# Patient Record
Sex: FEMALE | Race: BLACK OR AFRICAN AMERICAN | NOT HISPANIC OR LATINO
[De-identification: names, ages, dates, MRNs, and addresses within clinical notes are randomized per-mention and may not be internally consistent; named-entity substitution may affect disease eponyms.]

---

## 2017-02-24 ENCOUNTER — APPOINTMENT (OUTPATIENT)
Dept: INTERNAL MEDICINE | Facility: CLINIC | Age: 77
End: 2017-02-24

## 2017-02-24 VITALS — DIASTOLIC BLOOD PRESSURE: 76 MMHG | HEART RATE: 70 BPM | SYSTOLIC BLOOD PRESSURE: 142 MMHG | RESPIRATION RATE: 12 BRPM

## 2017-02-24 VITALS — HEIGHT: 67 IN | BODY MASS INDEX: 24.17 KG/M2 | WEIGHT: 154 LBS

## 2017-02-28 LAB
APPEARANCE: CLEAR
BILIRUBIN URINE: NEGATIVE
BLOOD URINE: NEGATIVE
COLOR: YELLOW
GLUCOSE QUALITATIVE U: NORMAL
KETONES URINE: NEGATIVE
LEUKOCYTE ESTERASE URINE: NORMAL
NITRITE URINE: NEGATIVE
PH URINE: 5
PROTEIN URINE: NEGATIVE
SPECIFIC GRAVITY URINE: 1.01
UROBILINOGEN URINE: NORMAL

## 2017-04-21 ENCOUNTER — FORM ENCOUNTER (OUTPATIENT)
Age: 77
End: 2017-04-21

## 2017-04-22 ENCOUNTER — OUTPATIENT (OUTPATIENT)
Dept: OUTPATIENT SERVICES | Facility: HOSPITAL | Age: 77
LOS: 1 days | End: 2017-04-22
Payer: MEDICARE

## 2017-04-22 ENCOUNTER — APPOINTMENT (OUTPATIENT)
Dept: MAMMOGRAPHY | Facility: IMAGING CENTER | Age: 77
End: 2017-04-22

## 2017-04-22 DIAGNOSIS — Z00.8 ENCOUNTER FOR OTHER GENERAL EXAMINATION: ICD-10-CM

## 2017-04-22 PROCEDURE — 77067 SCR MAMMO BI INCL CAD: CPT

## 2017-04-22 PROCEDURE — 77063 BREAST TOMOSYNTHESIS BI: CPT

## 2017-05-02 ENCOUNTER — FORM ENCOUNTER (OUTPATIENT)
Age: 77
End: 2017-05-02

## 2017-05-03 ENCOUNTER — OUTPATIENT (OUTPATIENT)
Dept: OUTPATIENT SERVICES | Facility: HOSPITAL | Age: 77
LOS: 1 days | End: 2017-05-03
Payer: MEDICARE

## 2017-05-03 ENCOUNTER — APPOINTMENT (OUTPATIENT)
Dept: ULTRASOUND IMAGING | Facility: IMAGING CENTER | Age: 77
End: 2017-05-03

## 2017-05-03 ENCOUNTER — APPOINTMENT (OUTPATIENT)
Dept: MAMMOGRAPHY | Facility: IMAGING CENTER | Age: 77
End: 2017-05-03

## 2017-05-03 DIAGNOSIS — Z00.8 ENCOUNTER FOR OTHER GENERAL EXAMINATION: ICD-10-CM

## 2017-05-03 PROCEDURE — G0279: CPT

## 2017-05-03 PROCEDURE — 76642 ULTRASOUND BREAST LIMITED: CPT

## 2017-05-03 PROCEDURE — 77065 DX MAMMO INCL CAD UNI: CPT

## 2017-05-08 ENCOUNTER — APPOINTMENT (OUTPATIENT)
Dept: INTERNAL MEDICINE | Facility: CLINIC | Age: 77
End: 2017-05-08

## 2017-05-08 VITALS — HEART RATE: 75 BPM | DIASTOLIC BLOOD PRESSURE: 58 MMHG | SYSTOLIC BLOOD PRESSURE: 100 MMHG | RESPIRATION RATE: 12 BRPM

## 2017-05-08 VITALS — BODY MASS INDEX: 24.33 KG/M2 | HEIGHT: 67 IN | WEIGHT: 155 LBS

## 2017-05-08 VITALS — DIASTOLIC BLOOD PRESSURE: 68 MMHG | SYSTOLIC BLOOD PRESSURE: 104 MMHG

## 2017-05-16 ENCOUNTER — APPOINTMENT (OUTPATIENT)
Dept: ULTRASOUND IMAGING | Facility: IMAGING CENTER | Age: 77
End: 2017-05-16

## 2017-05-16 ENCOUNTER — APPOINTMENT (OUTPATIENT)
Dept: MAMMOGRAPHY | Facility: IMAGING CENTER | Age: 77
End: 2017-05-16

## 2017-08-18 ENCOUNTER — APPOINTMENT (OUTPATIENT)
Dept: INTERNAL MEDICINE | Facility: CLINIC | Age: 77
End: 2017-08-18
Payer: MEDICARE

## 2017-08-18 ENCOUNTER — NON-APPOINTMENT (OUTPATIENT)
Age: 77
End: 2017-08-18

## 2017-08-18 VITALS — DIASTOLIC BLOOD PRESSURE: 60 MMHG | SYSTOLIC BLOOD PRESSURE: 108 MMHG

## 2017-08-18 PROCEDURE — 99215 OFFICE O/P EST HI 40 MIN: CPT | Mod: 25

## 2017-08-18 PROCEDURE — 36415 COLL VENOUS BLD VENIPUNCTURE: CPT

## 2017-08-18 PROCEDURE — 93000 ELECTROCARDIOGRAM COMPLETE: CPT

## 2017-08-23 LAB
25(OH)D3 SERPL-MCNC: 44.9 NG/ML
ALBUMIN SERPL ELPH-MCNC: 4.3 G/DL
ALP BLD-CCNC: 86 U/L
ALT SERPL-CCNC: 15 U/L
ANION GAP SERPL CALC-SCNC: 12 MMOL/L
AST SERPL-CCNC: 13 U/L
BASOPHILS # BLD AUTO: 0.03 K/UL
BASOPHILS NFR BLD AUTO: 0.3 %
BILIRUB DIRECT SERPL-MCNC: 0.1 MG/DL
BILIRUB INDIRECT SERPL-MCNC: 0.2 MG/DL
BILIRUB SERPL-MCNC: 0.3 MG/DL
BUN SERPL-MCNC: 18 MG/DL
CALCIUM SERPL-MCNC: 10.4 MG/DL
CHLORIDE SERPL-SCNC: 103 MMOL/L
CHOLEST SERPL-MCNC: 209 MG/DL
CHOLEST/HDLC SERPL: 3.1 RATIO
CO2 SERPL-SCNC: 27 MMOL/L
CREAT SERPL-MCNC: 1.11 MG/DL
EOSINOPHIL # BLD AUTO: 0.08 K/UL
EOSINOPHIL NFR BLD AUTO: 0.9 %
GLUCOSE SERPL-MCNC: 71 MG/DL
HBA1C MFR BLD HPLC: 6 %
HCT VFR BLD CALC: 42.7 %
HDLC SERPL-MCNC: 68 MG/DL
HGB BLD-MCNC: 12.8 G/DL
IMM GRANULOCYTES NFR BLD AUTO: 0.2 %
LDLC SERPL CALC-MCNC: 123 MG/DL
LYMPHOCYTES # BLD AUTO: 2.64 K/UL
LYMPHOCYTES NFR BLD AUTO: 30.4 %
MAN DIFF?: NORMAL
MCHC RBC-ENTMCNC: 30 GM/DL
MCHC RBC-ENTMCNC: 30.3 PG
MCV RBC AUTO: 100.9 FL
MONOCYTES # BLD AUTO: 0.68 K/UL
MONOCYTES NFR BLD AUTO: 7.8 %
NEUTROPHILS # BLD AUTO: 5.22 K/UL
NEUTROPHILS NFR BLD AUTO: 60.4 %
PLATELET # BLD AUTO: 287 K/UL
POTASSIUM SERPL-SCNC: 4.9 MMOL/L
PROT SERPL-MCNC: 7.5 G/DL
RBC # BLD: 4.23 M/UL
RBC # FLD: 13.8 %
SODIUM SERPL-SCNC: 142 MMOL/L
TRIGL SERPL-MCNC: 92 MG/DL
WBC # FLD AUTO: 8.67 K/UL

## 2017-11-16 ENCOUNTER — APPOINTMENT (OUTPATIENT)
Dept: INTERNAL MEDICINE | Facility: CLINIC | Age: 77
End: 2017-11-16
Payer: MEDICARE

## 2017-11-16 VITALS — RESPIRATION RATE: 12 BRPM | SYSTOLIC BLOOD PRESSURE: 116 MMHG | HEART RATE: 71 BPM | DIASTOLIC BLOOD PRESSURE: 66 MMHG

## 2017-11-16 VITALS — HEIGHT: 67 IN | BODY MASS INDEX: 25.11 KG/M2 | WEIGHT: 160 LBS

## 2017-11-16 PROCEDURE — 99214 OFFICE O/P EST MOD 30 MIN: CPT | Mod: 25

## 2017-11-16 PROCEDURE — G0008: CPT

## 2017-11-16 PROCEDURE — 90662 IIV NO PRSV INCREASED AG IM: CPT

## 2017-11-25 ENCOUNTER — RX RENEWAL (OUTPATIENT)
Age: 77
End: 2017-11-25

## 2017-12-29 ENCOUNTER — RX RENEWAL (OUTPATIENT)
Age: 77
End: 2017-12-29

## 2018-01-16 ENCOUNTER — FORM ENCOUNTER (OUTPATIENT)
Age: 78
End: 2018-01-16

## 2018-01-17 ENCOUNTER — OUTPATIENT (OUTPATIENT)
Dept: OUTPATIENT SERVICES | Facility: HOSPITAL | Age: 78
LOS: 1 days | End: 2018-01-17
Payer: MEDICARE

## 2018-01-17 ENCOUNTER — APPOINTMENT (OUTPATIENT)
Dept: MAMMOGRAPHY | Facility: IMAGING CENTER | Age: 78
End: 2018-01-17
Payer: MEDICARE

## 2018-01-17 ENCOUNTER — APPOINTMENT (OUTPATIENT)
Dept: ULTRASOUND IMAGING | Facility: IMAGING CENTER | Age: 78
End: 2018-01-17
Payer: MEDICARE

## 2018-01-17 DIAGNOSIS — Z00.00 ENCOUNTER FOR GENERAL ADULT MEDICAL EXAMINATION WITHOUT ABNORMAL FINDINGS: ICD-10-CM

## 2018-01-17 PROCEDURE — 77065 DX MAMMO INCL CAD UNI: CPT | Mod: 26,LT

## 2018-01-17 PROCEDURE — 77065 DX MAMMO INCL CAD UNI: CPT

## 2018-01-17 PROCEDURE — G0279: CPT | Mod: 26

## 2018-01-17 PROCEDURE — G0279: CPT

## 2018-02-20 ENCOUNTER — RX RENEWAL (OUTPATIENT)
Age: 78
End: 2018-02-20

## 2018-03-08 ENCOUNTER — APPOINTMENT (OUTPATIENT)
Dept: INTERNAL MEDICINE | Facility: CLINIC | Age: 78
End: 2018-03-08
Payer: MEDICARE

## 2018-03-08 VITALS — HEIGHT: 67 IN | BODY MASS INDEX: 24.8 KG/M2 | WEIGHT: 158 LBS

## 2018-03-08 VITALS — SYSTOLIC BLOOD PRESSURE: 108 MMHG | DIASTOLIC BLOOD PRESSURE: 74 MMHG

## 2018-03-08 VITALS — DIASTOLIC BLOOD PRESSURE: 80 MMHG | HEART RATE: 70 BPM | RESPIRATION RATE: 12 BRPM | SYSTOLIC BLOOD PRESSURE: 104 MMHG

## 2018-03-08 PROCEDURE — 99214 OFFICE O/P EST MOD 30 MIN: CPT

## 2018-04-23 ENCOUNTER — RX RENEWAL (OUTPATIENT)
Age: 78
End: 2018-04-23

## 2018-05-30 ENCOUNTER — FORM ENCOUNTER (OUTPATIENT)
Age: 78
End: 2018-05-30

## 2018-05-31 ENCOUNTER — APPOINTMENT (OUTPATIENT)
Dept: ULTRASOUND IMAGING | Facility: IMAGING CENTER | Age: 78
End: 2018-05-31
Payer: MEDICARE

## 2018-05-31 ENCOUNTER — RX RENEWAL (OUTPATIENT)
Age: 78
End: 2018-05-31

## 2018-05-31 ENCOUNTER — APPOINTMENT (OUTPATIENT)
Dept: MAMMOGRAPHY | Facility: IMAGING CENTER | Age: 78
End: 2018-05-31
Payer: MEDICARE

## 2018-05-31 ENCOUNTER — OUTPATIENT (OUTPATIENT)
Dept: OUTPATIENT SERVICES | Facility: HOSPITAL | Age: 78
LOS: 1 days | End: 2018-05-31
Payer: MEDICARE

## 2018-05-31 DIAGNOSIS — Z00.00 ENCOUNTER FOR GENERAL ADULT MEDICAL EXAMINATION WITHOUT ABNORMAL FINDINGS: ICD-10-CM

## 2018-05-31 PROCEDURE — 77067 SCR MAMMO BI INCL CAD: CPT | Mod: 26

## 2018-05-31 PROCEDURE — 77063 BREAST TOMOSYNTHESIS BI: CPT | Mod: 26

## 2018-05-31 PROCEDURE — 77063 BREAST TOMOSYNTHESIS BI: CPT

## 2018-05-31 PROCEDURE — 77067 SCR MAMMO BI INCL CAD: CPT

## 2018-05-31 PROCEDURE — 76641 ULTRASOUND BREAST COMPLETE: CPT

## 2018-05-31 PROCEDURE — 76641 ULTRASOUND BREAST COMPLETE: CPT | Mod: 26,50

## 2018-06-27 ENCOUNTER — APPOINTMENT (OUTPATIENT)
Dept: INTERNAL MEDICINE | Facility: CLINIC | Age: 78
End: 2018-06-27
Payer: MEDICARE

## 2018-06-27 VITALS — RESPIRATION RATE: 12 BRPM | SYSTOLIC BLOOD PRESSURE: 120 MMHG | HEART RATE: 72 BPM | DIASTOLIC BLOOD PRESSURE: 70 MMHG

## 2018-06-27 VITALS — HEIGHT: 67 IN | WEIGHT: 161 LBS | BODY MASS INDEX: 25.27 KG/M2

## 2018-06-27 PROCEDURE — 36415 COLL VENOUS BLD VENIPUNCTURE: CPT

## 2018-06-27 PROCEDURE — 99214 OFFICE O/P EST MOD 30 MIN: CPT | Mod: 25

## 2018-06-27 NOTE — REVIEW OF SYSTEMS
[Headache] : headache [Anxiety] : anxiety [Depression] : depression [Negative] : Heme/Lymph [Suicidal] : not suicidal [FreeTextEntry2] : see HPI [de-identified] : See HPI

## 2018-06-27 NOTE — HISTORY OF PRESENT ILLNESS
[FreeTextEntry1] : F/up for DM HCVD chol low vit D  \par c/o headache.  Few details offered.  . \par

## 2018-06-27 NOTE — ASSESSMENT
[FreeTextEntry1] : Denies hallucinations that she has about her home. \par Moist cerumen bilat decline ENT\par Denies facial numbness in bed  with change in speech, arm or leg weakness.  Cont ASA\par Falling episodes in past. Resolved  Sees Neuro Dr. Morgan [facility]  EEG neg.  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 Cont  ASA.\par HCVD\par  Anemia on Fe.  had transfusion 2013 with neg EGD and colonoscopy  Hb normal.  Rec DC\par HCVD. OK\par Hyperchol.  Improved  taking Zocor most days.   \par DM HBA1C    \par Urinary burning resolved.  Admits her freq is large volume and she drinks well. \par Cataracts.  Told 1 yr f/up\par Constip.  Ok to use MiraLAX qd.\par

## 2018-06-27 NOTE — HEALTH RISK ASSESSMENT
[No falls in past year] : Patient reported no falls in the past year [0] : 2) Feeling down, depressed, or hopeless: Not at all (0) [] : No [MPG1Osusp] : 0

## 2018-06-27 NOTE — PHYSICAL EXAM
[No Acute Distress] : no acute distress [Well Nourished] : well nourished [Well Developed] : well developed [Well-Appearing] : well-appearing [Normal Sclera/Conjunctiva] : normal sclera/conjunctiva [PERRL] : pupils equal round and reactive to light [EOMI] : extraocular movements intact [Normal Outer Ear/Nose] : the outer ears and nose were normal in appearance [Normal Oropharynx] : the oropharynx was normal [Normal TMs] : both tympanic membranes were normal [No JVD] : no jugular venous distention [No Lymphadenopathy] : no lymphadenopathy [Thyroid Normal, No Nodules] : the thyroid was normal and there were no nodules present [No Respiratory Distress] : no respiratory distress  [Clear to Auscultation] : lungs were clear to auscultation bilaterally [No Accessory Muscle Use] : no accessory muscle use [Normal Rate] : normal rate  [Regular Rhythm] : with a regular rhythm [Normal S1, S2] : normal S1 and S2 [No Murmur] : no murmur heard [No Carotid Bruits] : no carotid bruits [No Abdominal Bruit] : a ~M bruit was not heard ~T in the abdomen [No Varicosities] : no varicosities [Pedal Pulses Present] : the pedal pulses are present [No Edema] : there was no peripheral edema [No Extremity Clubbing/Cyanosis] : no extremity clubbing/cyanosis [No Palpable Aorta] : no palpable aorta [Soft] : abdomen soft [Non Tender] : non-tender [Non-distended] : non-distended [No Masses] : no abdominal mass palpated [No HSM] : no HSM [Normal Bowel Sounds] : normal bowel sounds [Normal Supraclavicular Nodes] : no supraclavicular lymphadenopathy [Normal Posterior Cervical Nodes] : no posterior cervical lymphadenopathy [Normal Anterior Cervical Nodes] : no anterior cervical lymphadenopathy [No CVA Tenderness] : no CVA  tenderness [No Spinal Tenderness] : no spinal tenderness [No Joint Swelling] : no joint swelling [Grossly Normal Strength/Tone] : grossly normal strength/tone [No Skin Lesions] : no skin lesions [Normal Gait] : normal gait [Coordination Grossly Intact] : coordination grossly intact [No Focal Deficits] : no focal deficits [Speech Grossly Normal] : speech grossly normal [Memory Grossly Normal] : memory grossly normal [Normal Affect] : the affect was normal [Alert and Oriented x3] : oriented to person, place, and time [Normal Mood] : the mood was normal [Normal Insight/Judgement] : insight and judgment were intact [Comprehensive Foot Exam Normal] : Right and left foot were examined and both feet are normal. No ulcers in either foot. Toes are normal and with full ROM.  Normal tactile sensation with monofilament testing throughout both feet [de-identified] : cerumen impac bilat moist

## 2018-06-28 LAB
25(OH)D3 SERPL-MCNC: 39.7 NG/ML
ALBUMIN SERPL ELPH-MCNC: 4.1 G/DL
ALP BLD-CCNC: 72 U/L
ALT SERPL-CCNC: 16 U/L
ANION GAP SERPL CALC-SCNC: 12 MMOL/L
AST SERPL-CCNC: 22 U/L
BASOPHILS # BLD AUTO: 0.02 K/UL
BASOPHILS NFR BLD AUTO: 0.2 %
BILIRUB SERPL-MCNC: 0.3 MG/DL
BUN SERPL-MCNC: 23 MG/DL
CALCIUM SERPL-MCNC: 9.6 MG/DL
CHLORIDE SERPL-SCNC: 102 MMOL/L
CHOLEST SERPL-MCNC: 195 MG/DL
CHOLEST/HDLC SERPL: 2.8 RATIO
CO2 SERPL-SCNC: 27 MMOL/L
CREAT SERPL-MCNC: 0.99 MG/DL
EOSINOPHIL # BLD AUTO: 0.03 K/UL
EOSINOPHIL NFR BLD AUTO: 0.3 %
GLUCOSE SERPL-MCNC: 72 MG/DL
HBA1C MFR BLD HPLC: 5.7 %
HCT VFR BLD CALC: 40.7 %
HCV AB SER QL: NONREACTIVE
HCV S/CO RATIO: 0.19 S/CO
HDLC SERPL-MCNC: 70 MG/DL
HGB BLD-MCNC: 12.7 G/DL
IMM GRANULOCYTES NFR BLD AUTO: 0.2 %
LDLC SERPL CALC-MCNC: 104 MG/DL
LYMPHOCYTES # BLD AUTO: 2.25 K/UL
LYMPHOCYTES NFR BLD AUTO: 25 %
MAN DIFF?: NORMAL
MCHC RBC-ENTMCNC: 31.1 PG
MCHC RBC-ENTMCNC: 31.2 GM/DL
MCV RBC AUTO: 99.8 FL
MONOCYTES # BLD AUTO: 0.64 K/UL
MONOCYTES NFR BLD AUTO: 7.1 %
NEUTROPHILS # BLD AUTO: 6.05 K/UL
NEUTROPHILS NFR BLD AUTO: 67.2 %
PLATELET # BLD AUTO: 251 K/UL
POTASSIUM SERPL-SCNC: 4.6 MMOL/L
PROT SERPL-MCNC: 7 G/DL
RBC # BLD: 4.08 M/UL
RBC # FLD: 13.6 %
SODIUM SERPL-SCNC: 141 MMOL/L
TRIGL SERPL-MCNC: 106 MG/DL
TSH SERPL-ACNC: 0.93 UIU/ML
WBC # FLD AUTO: 9.01 K/UL

## 2018-08-01 ENCOUNTER — APPOINTMENT (OUTPATIENT)
Dept: INTERNAL MEDICINE | Facility: CLINIC | Age: 78
End: 2018-08-01
Payer: MEDICARE

## 2018-08-01 VITALS — RESPIRATION RATE: 12 BRPM | HEART RATE: 70 BPM | DIASTOLIC BLOOD PRESSURE: 68 MMHG | SYSTOLIC BLOOD PRESSURE: 102 MMHG

## 2018-08-01 PROCEDURE — 99214 OFFICE O/P EST MOD 30 MIN: CPT

## 2018-08-01 NOTE — HISTORY OF PRESENT ILLNESS
[FreeTextEntry1] : F/up for DM HCVD chol low vit D  \par c/o "I don’t know"  Says she walks off balance.  "hand tremble"  Says "the remote for the TV was in my hands and I didn’t know what to do with it"  \par Admits "maybe" when asked about depression.\par Admits she forgets her meds "sometimes" \par

## 2018-08-01 NOTE — ASSESSMENT
[FreeTextEntry1] : Dementia, depression, anxiety. Hallucination in the past.  Should see neuro MD given her concerns about it with unsteady gait and tremors. \par Falling episodes in past. Resolved  [facility]  [facility]  EEG neg.  Carotid sono.  \par WIll refer to neuro MD since pt cant recall who she saw in the past. 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 Cont  ASA.\par Moist cerumen bilat decline ENT\par Anemia on Fe resolved.  Had transfusion 2013 with neg EGD and colonoscopy  Hb normal.  Rec DC\par HCVD. OK\par Hyperchol.  Improved  taking Zocor most days.   \par DM HBA1C    5.7\par Cataracts.  Told 1 yr f/up\par Constip.  Long hx.  Ok to use MiraLAX qd.\par

## 2018-08-01 NOTE — PHYSICAL EXAM
[No Acute Distress] : no acute distress [Well Nourished] : well nourished [Well Developed] : well developed [Well-Appearing] : well-appearing [Normal Sclera/Conjunctiva] : normal sclera/conjunctiva [PERRL] : pupils equal round and reactive to light [EOMI] : extraocular movements intact [Normal Outer Ear/Nose] : the outer ears and nose were normal in appearance [Normal Oropharynx] : the oropharynx was normal [Normal TMs] : both tympanic membranes were normal [No JVD] : no jugular venous distention [No Lymphadenopathy] : no lymphadenopathy [Thyroid Normal, No Nodules] : the thyroid was normal and there were no nodules present [No Respiratory Distress] : no respiratory distress  [Clear to Auscultation] : lungs were clear to auscultation bilaterally [No Accessory Muscle Use] : no accessory muscle use [Normal Rate] : normal rate  [Regular Rhythm] : with a regular rhythm [Normal S1, S2] : normal S1 and S2 [No Murmur] : no murmur heard [No Carotid Bruits] : no carotid bruits [No Abdominal Bruit] : a ~M bruit was not heard ~T in the abdomen [No Varicosities] : no varicosities [Pedal Pulses Present] : the pedal pulses are present [No Edema] : there was no peripheral edema [No Extremity Clubbing/Cyanosis] : no extremity clubbing/cyanosis [No Palpable Aorta] : no palpable aorta [Soft] : abdomen soft [Non Tender] : non-tender [Non-distended] : non-distended [No Masses] : no abdominal mass palpated [No HSM] : no HSM [Normal Bowel Sounds] : normal bowel sounds [Normal Supraclavicular Nodes] : no supraclavicular lymphadenopathy [Normal Posterior Cervical Nodes] : no posterior cervical lymphadenopathy [Normal Anterior Cervical Nodes] : no anterior cervical lymphadenopathy [No CVA Tenderness] : no CVA  tenderness [No Spinal Tenderness] : no spinal tenderness [No Joint Swelling] : no joint swelling [Grossly Normal Strength/Tone] : grossly normal strength/tone [No Skin Lesions] : no skin lesions [Normal Gait] : normal gait [Coordination Grossly Intact] : coordination grossly intact [No Focal Deficits] : no focal deficits [Speech Grossly Normal] : speech grossly normal [Memory Grossly Normal] : memory grossly normal [Normal Affect] : the affect was normal [Alert and Oriented x3] : oriented to person, place, and time [Normal Mood] : the mood was normal [Normal Insight/Judgement] : insight and judgment were intact [Comprehensive Foot Exam Normal] : Right and left foot were examined and both feet are normal. No ulcers in either foot. Toes are normal and with full ROM.  Normal tactile sensation with monofilament testing throughout both feet [No Rash] : no rash [de-identified] : cerumen mod bilat moist

## 2018-08-01 NOTE — REVIEW OF SYSTEMS
[Constipation] : constipation [Confusion] : confusion [Memory Loss] : memory loss [Unsteady Walking] : ataxia [Anxiety] : anxiety [Depression] : depression [Negative] : Heme/Lymph [Chest Pain] : no chest pain [Palpitations] : no palpitations [Lower Ext Edema] : no lower extremity edema [Melena] : no melena [Joint Pain] : no joint pain [Muscle Pain] : no muscle pain [Back Pain] : no back pain [Headache] : no headache [Dizziness] : no dizziness [Fainting] : no fainting [Suicidal] : not suicidal [FreeTextEntry2] : see HPI [FreeTextEntry7] : "suffers" from constip but suspect no change in pattern [de-identified] : See HPI

## 2018-08-01 NOTE — HEALTH RISK ASSESSMENT
[No falls in past year] : Patient reported no falls in the past year [1] : 2) Feeling down, depressed, or hopeless for several days (1) [] : No [[id number]] : 2

## 2018-08-30 ENCOUNTER — RX RENEWAL (OUTPATIENT)
Age: 78
End: 2018-08-30

## 2018-09-25 ENCOUNTER — LABORATORY RESULT (OUTPATIENT)
Age: 78
End: 2018-09-25

## 2018-09-25 ENCOUNTER — APPOINTMENT (OUTPATIENT)
Dept: NEUROLOGY | Facility: CLINIC | Age: 78
End: 2018-09-25
Payer: MEDICARE

## 2018-09-25 VITALS
OXYGEN SATURATION: 98 % | WEIGHT: 157 LBS | DIASTOLIC BLOOD PRESSURE: 80 MMHG | SYSTOLIC BLOOD PRESSURE: 118 MMHG | BODY MASS INDEX: 24.64 KG/M2 | HEIGHT: 67 IN | HEART RATE: 72 BPM

## 2018-09-25 PROCEDURE — 99205 OFFICE O/P NEW HI 60 MIN: CPT

## 2018-10-08 ENCOUNTER — FORM ENCOUNTER (OUTPATIENT)
Age: 78
End: 2018-10-08

## 2018-10-09 ENCOUNTER — APPOINTMENT (OUTPATIENT)
Dept: MRI IMAGING | Facility: IMAGING CENTER | Age: 78
End: 2018-10-09
Payer: MEDICARE

## 2018-10-09 ENCOUNTER — OUTPATIENT (OUTPATIENT)
Dept: OUTPATIENT SERVICES | Facility: HOSPITAL | Age: 78
LOS: 1 days | End: 2018-10-09
Payer: MEDICARE

## 2018-10-09 DIAGNOSIS — Z00.8 ENCOUNTER FOR OTHER GENERAL EXAMINATION: ICD-10-CM

## 2018-10-09 PROCEDURE — 70551 MRI BRAIN STEM W/O DYE: CPT | Mod: 26

## 2018-10-09 PROCEDURE — 70551 MRI BRAIN STEM W/O DYE: CPT

## 2018-11-30 ENCOUNTER — APPOINTMENT (OUTPATIENT)
Dept: NEUROLOGY | Facility: CLINIC | Age: 78
End: 2018-11-30

## 2019-01-08 ENCOUNTER — RX RENEWAL (OUTPATIENT)
Age: 79
End: 2019-01-08

## 2019-01-31 ENCOUNTER — APPOINTMENT (OUTPATIENT)
Dept: INTERNAL MEDICINE | Facility: CLINIC | Age: 79
End: 2019-01-31
Payer: MEDICARE

## 2019-01-31 ENCOUNTER — NON-APPOINTMENT (OUTPATIENT)
Age: 79
End: 2019-01-31

## 2019-01-31 VITALS — BODY MASS INDEX: 25.27 KG/M2 | HEIGHT: 67 IN | WEIGHT: 161 LBS

## 2019-01-31 VITALS — HEART RATE: 74 BPM | RESPIRATION RATE: 12 BRPM | SYSTOLIC BLOOD PRESSURE: 132 MMHG | DIASTOLIC BLOOD PRESSURE: 80 MMHG

## 2019-01-31 DIAGNOSIS — Z97.4 PRESENCE OF EXTERNAL HEARING-AID: ICD-10-CM

## 2019-01-31 PROCEDURE — 90662 IIV NO PRSV INCREASED AG IM: CPT

## 2019-01-31 PROCEDURE — 90670 PCV13 VACCINE IM: CPT

## 2019-01-31 PROCEDURE — G0008: CPT

## 2019-01-31 PROCEDURE — G0009: CPT

## 2019-01-31 PROCEDURE — 99214 OFFICE O/P EST MOD 30 MIN: CPT | Mod: 25

## 2019-01-31 NOTE — REVIEW OF SYSTEMS
[Constipation] : constipation [Confusion] : confusion [Memory Loss] : memory loss [Unsteady Walking] : ataxia [Anxiety] : anxiety [Depression] : depression [Negative] : Heme/Lymph [Chest Pain] : no chest pain [Palpitations] : no palpitations [Lower Ext Edema] : no lower extremity edema [Melena] : no melena [Joint Pain] : no joint pain [Muscle Pain] : no muscle pain [Back Pain] : no back pain [Headache] : no headache [Dizziness] : no dizziness [Fainting] : no fainting [Suicidal] : not suicidal [FreeTextEntry2] : see HPI [FreeTextEntry5] : Says she wants a heart test and says her heart was "fast" last week. [de-identified] : See HPI

## 2019-01-31 NOTE — HISTORY OF PRESENT ILLNESS
[FreeTextEntry1] : F/up for DM HCVD chol low vit D  Not taking Vit D\par Eval of dementia with vasc changes on MRI.  \par Asking about nightmares and hallucinations but no detail.\par Worried about "appetite" but no wt loss noted.  \par  \par  \par Admits she forgets her meds "sometimes" \par

## 2019-01-31 NOTE — ASSESSMENT
[FreeTextEntry1] : Dementia, depression, anxiety. Hallucination in the past.   Saw Dr Harley.  MRI shows vasc change. 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 Cont  ASA.\par Cerumen impac. Deep.   Advised ENT\par Anemia on Fe resolved.  Had transfusion 2013 with neg EGD and colonoscopy  Hb normal.  Rec DC\par HCVD. OK\par Hyperchol. Cont Zocor  \par DM HBA1C    5.7\par Cataracts.  Told 1 yr f/up\par Constip.  Seems OK\par Gait instability.  PT ordered. \par Low Vit D.  Advised compliance with Vit D. \par Palpit.  Vague.  No assoc card details.  Suspect pt anxious and just asking for EKG.\par

## 2019-01-31 NOTE — PHYSICAL EXAM
[No Acute Distress] : no acute distress [Well Nourished] : well nourished [Well Developed] : well developed [Well-Appearing] : well-appearing [Normal Sclera/Conjunctiva] : normal sclera/conjunctiva [PERRL] : pupils equal round and reactive to light [EOMI] : extraocular movements intact [Normal Outer Ear/Nose] : the outer ears and nose were normal in appearance [Normal Oropharynx] : the oropharynx was normal [Normal TMs] : both tympanic membranes were normal [No JVD] : no jugular venous distention [No Lymphadenopathy] : no lymphadenopathy [Thyroid Normal, No Nodules] : the thyroid was normal and there were no nodules present [No Respiratory Distress] : no respiratory distress  [Clear to Auscultation] : lungs were clear to auscultation bilaterally [No Accessory Muscle Use] : no accessory muscle use [Normal Rate] : normal rate  [Regular Rhythm] : with a regular rhythm [Normal S1, S2] : normal S1 and S2 [No Murmur] : no murmur heard [No Carotid Bruits] : no carotid bruits [No Abdominal Bruit] : a ~M bruit was not heard ~T in the abdomen [No Varicosities] : no varicosities [Pedal Pulses Present] : the pedal pulses are present [No Edema] : there was no peripheral edema [No Extremity Clubbing/Cyanosis] : no extremity clubbing/cyanosis [No Palpable Aorta] : no palpable aorta [Soft] : abdomen soft [Non Tender] : non-tender [Non-distended] : non-distended [No Masses] : no abdominal mass palpated [No HSM] : no HSM [Normal Bowel Sounds] : normal bowel sounds [Normal Supraclavicular Nodes] : no supraclavicular lymphadenopathy [Normal Posterior Cervical Nodes] : no posterior cervical lymphadenopathy [Normal Anterior Cervical Nodes] : no anterior cervical lymphadenopathy [No CVA Tenderness] : no CVA  tenderness [No Spinal Tenderness] : no spinal tenderness [No Joint Swelling] : no joint swelling [Grossly Normal Strength/Tone] : grossly normal strength/tone [No Rash] : no rash [No Skin Lesions] : no skin lesions [Normal Gait] : normal gait [Coordination Grossly Intact] : coordination grossly intact [No Focal Deficits] : no focal deficits [Speech Grossly Normal] : speech grossly normal [Memory Grossly Normal] : memory grossly normal [Normal Affect] : the affect was normal [Alert and Oriented x3] : oriented to person, place, and time [Normal Mood] : the mood was normal [Normal Insight/Judgement] : insight and judgment were intact [Comprehensive Foot Exam Normal] : Right and left foot were examined and both feet are normal. No ulcers in either foot. Toes are normal and with full ROM.  Normal tactile sensation with monofilament testing throughout both feet [de-identified] : cerumen impac R  L not examined due to aide

## 2019-02-28 ENCOUNTER — RX RENEWAL (OUTPATIENT)
Age: 79
End: 2019-02-28

## 2019-03-28 ENCOUNTER — APPOINTMENT (OUTPATIENT)
Dept: INTERNAL MEDICINE | Facility: CLINIC | Age: 79
End: 2019-03-28
Payer: MEDICARE

## 2019-03-28 VITALS
SYSTOLIC BLOOD PRESSURE: 124 MMHG | BODY MASS INDEX: 24.17 KG/M2 | DIASTOLIC BLOOD PRESSURE: 82 MMHG | HEART RATE: 68 BPM | HEIGHT: 67 IN | WEIGHT: 154 LBS

## 2019-03-28 VITALS — DIASTOLIC BLOOD PRESSURE: 64 MMHG | SYSTOLIC BLOOD PRESSURE: 100 MMHG | HEART RATE: 70 BPM | RESPIRATION RATE: 12 BRPM

## 2019-03-28 DIAGNOSIS — H61.23 IMPACTED CERUMEN, BILATERAL: ICD-10-CM

## 2019-03-28 PROCEDURE — 99215 OFFICE O/P EST HI 40 MIN: CPT

## 2019-03-28 NOTE — PHYSICAL EXAM
[No Acute Distress] : no acute distress [Well Nourished] : well nourished [Well Developed] : well developed [Well-Appearing] : well-appearing [Normal Sclera/Conjunctiva] : normal sclera/conjunctiva [PERRL] : pupils equal round and reactive to light [EOMI] : extraocular movements intact [Normal Outer Ear/Nose] : the outer ears and nose were normal in appearance [Normal Oropharynx] : the oropharynx was normal [Normal TMs] : both tympanic membranes were normal [No JVD] : no jugular venous distention [No Lymphadenopathy] : no lymphadenopathy [Thyroid Normal, No Nodules] : the thyroid was normal and there were no nodules present [No Respiratory Distress] : no respiratory distress  [Clear to Auscultation] : lungs were clear to auscultation bilaterally [No Accessory Muscle Use] : no accessory muscle use [Normal Rate] : normal rate  [Regular Rhythm] : with a regular rhythm [Normal S1, S2] : normal S1 and S2 [No Murmur] : no murmur heard [No Carotid Bruits] : no carotid bruits [No Abdominal Bruit] : a ~M bruit was not heard ~T in the abdomen [No Varicosities] : no varicosities [Pedal Pulses Present] : the pedal pulses are present [No Edema] : there was no peripheral edema [No Extremity Clubbing/Cyanosis] : no extremity clubbing/cyanosis [No Palpable Aorta] : no palpable aorta [Soft] : abdomen soft [Non Tender] : non-tender [Non-distended] : non-distended [No Masses] : no abdominal mass palpated [No HSM] : no HSM [Normal Bowel Sounds] : normal bowel sounds [Normal Supraclavicular Nodes] : no supraclavicular lymphadenopathy [Normal Posterior Cervical Nodes] : no posterior cervical lymphadenopathy [Normal Anterior Cervical Nodes] : no anterior cervical lymphadenopathy [No CVA Tenderness] : no CVA  tenderness [No Spinal Tenderness] : no spinal tenderness [No Joint Swelling] : no joint swelling [Grossly Normal Strength/Tone] : grossly normal strength/tone [No Rash] : no rash [No Skin Lesions] : no skin lesions [Normal Gait] : normal gait [Coordination Grossly Intact] : coordination grossly intact [No Focal Deficits] : no focal deficits [Speech Grossly Normal] : speech grossly normal [Memory Grossly Normal] : memory grossly normal [Normal Affect] : the affect was normal [Alert and Oriented x3] : oriented to person, place, and time [Normal Mood] : the mood was normal [Normal Insight/Judgement] : insight and judgment were intact [Comprehensive Foot Exam Normal] : Right and left foot were examined and both feet are normal. No ulcers in either foot. Toes are normal and with full ROM.  Normal tactile sensation with monofilament testing throughout both feet [de-identified] : hawa impac bilat

## 2019-03-28 NOTE — HISTORY OF PRESENT ILLNESS
[FreeTextEntry1] : F/up for DM HCVD chol low vit D  Not taking Vit D\par Eval of dementia with vasc changes on MRI.  \par Consults from neuro and neuropsych noted.  \par No recent falls\par She does not have a life alert.

## 2019-03-28 NOTE — REVIEW OF SYSTEMS
[Confusion] : confusion [Memory Loss] : memory loss [Unsteady Walking] : ataxia [Anxiety] : anxiety [Depression] : depression [Negative] : Heme/Lymph [Chest Pain] : no chest pain [Palpitations] : no palpitations [Lower Ext Edema] : no lower extremity edema [Melena] : no melena [Joint Pain] : no joint pain [Muscle Pain] : no muscle pain [Back Pain] : no back pain [Headache] : no headache [Dizziness] : no dizziness [Fainting] : no fainting [Suicidal] : not suicidal [FreeTextEntry2] : see HPI [de-identified] : See HPI

## 2019-03-28 NOTE — ASSESSMENT
[FreeTextEntry1] : Dementia, depression, anxiety. Hallucination in the past.   Saw Dr Harley.  MRI shows vasc change. Neuro psych report excellent .  Needs HHA/.  Dtr considering moving her in with her.  Has cameras that dtr watches.  Perhaps should have call bell alert.  Dtr wants dementia pill ordered per neuro psych  Refer to [facility] care\par TIA 1980's had Coumadin then ASA related to carotid artery. Cont  ASA.\par Cerumen impac. Deep.   Advised ENT Has seen Dr Ronquillo\par Anemia .  Had transfusion 2013 with neg EGD and colonoscopy  Hb normal.   \par HCVD. OK\par Hyperchol. Cont Zocor  \par DM HBA1C    5.7\par Cataracts.  Told 1 yr f/up\par Constip.  Seems OK\par Gait instability.  Getting  PT  \par Low Vit D.  Better\par  \par

## 2019-04-06 ENCOUNTER — RX RENEWAL (OUTPATIENT)
Age: 79
End: 2019-04-06

## 2019-04-08 NOTE — PAST MEDICAL HISTORY
Pt sent message using portal       ----- Message from Cara Franco sent at 4/8/2019  3:25 PM CDT -----  Contact: self   Ray    Needs Advice    Reason for call: returning your call re appt         Communication Preference:[phone]    Additional Information:       [Postmenopausal] : postmenopausal

## 2019-04-17 ENCOUNTER — APPOINTMENT (OUTPATIENT)
Dept: NEUROLOGY | Facility: CLINIC | Age: 79
End: 2019-04-17

## 2019-05-08 ENCOUNTER — APPOINTMENT (OUTPATIENT)
Dept: NEUROLOGY | Facility: CLINIC | Age: 79
End: 2019-05-08

## 2019-05-15 ENCOUNTER — APPOINTMENT (OUTPATIENT)
Dept: NEUROLOGY | Facility: CLINIC | Age: 79
End: 2019-05-15

## 2019-06-17 ENCOUNTER — FORM ENCOUNTER (OUTPATIENT)
Age: 79
End: 2019-06-17

## 2019-06-18 ENCOUNTER — OUTPATIENT (OUTPATIENT)
Dept: OUTPATIENT SERVICES | Facility: HOSPITAL | Age: 79
LOS: 1 days | End: 2019-06-18
Payer: MEDICARE

## 2019-06-18 ENCOUNTER — APPOINTMENT (OUTPATIENT)
Dept: RADIOLOGY | Facility: HOSPITAL | Age: 79
End: 2019-06-18

## 2019-06-18 ENCOUNTER — APPOINTMENT (OUTPATIENT)
Dept: NEUROLOGY | Facility: CLINIC | Age: 79
End: 2019-06-18
Payer: MEDICARE

## 2019-06-18 VITALS
TEMPERATURE: 98.2 F | OXYGEN SATURATION: 98 % | WEIGHT: 156 LBS | DIASTOLIC BLOOD PRESSURE: 80 MMHG | RESPIRATION RATE: 15 BRPM | HEART RATE: 58 BPM | BODY MASS INDEX: 24.48 KG/M2 | SYSTOLIC BLOOD PRESSURE: 120 MMHG | HEIGHT: 67 IN

## 2019-06-18 DIAGNOSIS — R29.6 REPEATED FALLS: ICD-10-CM

## 2019-06-18 PROCEDURE — 72100 X-RAY EXAM L-S SPINE 2/3 VWS: CPT | Mod: 26

## 2019-06-18 PROCEDURE — 72220 X-RAY EXAM SACRUM TAILBONE: CPT | Mod: 26

## 2019-06-18 PROCEDURE — 72100 X-RAY EXAM L-S SPINE 2/3 VWS: CPT

## 2019-06-18 PROCEDURE — 72220 X-RAY EXAM SACRUM TAILBONE: CPT

## 2019-06-18 PROCEDURE — 99214 OFFICE O/P EST MOD 30 MIN: CPT

## 2019-06-18 RX ORDER — DONEPEZIL HYDROCHLORIDE 5 MG/1
5 TABLET ORAL DAILY
Qty: 90 | Refills: 3 | Status: DISCONTINUED | COMMUNITY
Start: 2019-03-28 | End: 2019-06-18

## 2019-06-18 NOTE — HISTORY OF PRESENT ILLNESS
[FreeTextEntry1] : 78 W who is here for initial consultation of memory loss that started about a year ago. She also has history of gait instability that was mentioned on a hct in 2014 that was unremarkable. When asked what she's forgetting, she pauses and mentions that the history is hard to remember. She lives by herself and has left the stove on. She eats 2 meals a day whereas she used to eat 3 meals. She eats chicken only for dinner. We started the conversation about an aide or assisted living but patient is very hesitant. She has recently showed depressed mood since the death of her mother 7 years ago and the moving out of her granddaughter 3 years ago. She feels lonely and isolated. She also experiences hallucinations especially at night. \par \par interval history: she is here for followup. Her neuropsych report was reviewed. She states her hallucinations have improved. She was started on aricept by Dr. Alicea. 3 weeks ago, she missed the seat and fell on the ground. Her tailbone and lower back is still painful at times. her labs from last visit was reviewed as well. She has cramps in her head and daughter via aunt wanted pet scan?

## 2019-06-18 NOTE — PHYSICAL EXAM
[General Appearance - Alert] : alert [Oriented To Time, Place, And Person] : oriented to person, place, and time [Total Score ___ / 30] : the patient achieved a score of [unfilled] /30 [Cranial Nerves Optic (II)] : visual acuity intact bilaterally,  visual fields full to confrontation, pupils equal round and reactive to light [Cranial Nerves Oculomotor (III)] : extraocular motion intact [Cranial Nerves Trigeminal (V)] : facial sensation intact symmetrically [Cranial Nerves Facial (VII)] : face symmetrical [Cranial Nerves Vestibulocochlear (VIII)] : hearing was intact bilaterally [Cranial Nerves Glossopharyngeal (IX)] : tongue and palate midline [Cranial Nerves Accessory (XI - Cranial And Spinal)] : head turning and shoulder shrug symmetric [Cranial Nerves Hypoglossal (XII)] : there was no tongue deviation with protrusion [Motor Tone] : muscle tone was normal in all four extremities [Motor Strength] : muscle strength was normal in all four extremities [Sensation Tactile Decrease] : light touch was intact [Abnormal Walk] : normal gait [Coordination - Dysmetria Impaired Finger-to-Nose Bilateral] : not present [1+] : Patella left 1+ [FreeTextEntry8] : normal base and swings both arms  [Extraocular Movements] : extraocular movements were intact [Optic Disc Abnormality] : the optic disc were normal in size and color [Hearing Threshold Finger Rub Not [place]] : hearing was normal [Full Pulse] : the pedal pulses are present [FreeTextEntry1] : see above [] : no rash

## 2019-06-18 NOTE — DISCUSSION/SUMMARY
[FreeTextEntry1] : 78 W who presents for followup of her lewy body dementia. Given her gait instability, will change aricept to namenda. \par \par She will have xray of coccyx and l spine given her recent fall to r/o fracture. \par \par Her daughter was advised to come with her mom during her followup. patient's aunt asked if she should be cooking. Pt has times when the food burned. She was advised she should not be using the stove. \par \par We will explore the head spasms in more detail in the followup visit.\par \par Patient was advised to continue to monitor for neurologic symptoms and to notify my office or go to the nearest emergency room if there are any changes. Neurologic issues were discussed with the patient. All questions were answered to complete satisfaction. Education was provided to the patient during this encounter.\par Side effects of the above medications were discussed in detail including but not limited to applicable black box warning and teratogenicity as appropriate. \par Patient was advised to bring previous records to my office. \par \par

## 2019-07-02 ENCOUNTER — RESULT REVIEW (OUTPATIENT)
Age: 79
End: 2019-07-02

## 2019-07-10 ENCOUNTER — APPOINTMENT (OUTPATIENT)
Dept: NEUROLOGY | Facility: CLINIC | Age: 79
End: 2019-07-10
Payer: MEDICARE

## 2019-07-10 VITALS
HEART RATE: 66 BPM | BODY MASS INDEX: 24.64 KG/M2 | DIASTOLIC BLOOD PRESSURE: 83 MMHG | HEIGHT: 67 IN | WEIGHT: 157 LBS | SYSTOLIC BLOOD PRESSURE: 153 MMHG

## 2019-07-10 PROCEDURE — 99214 OFFICE O/P EST MOD 30 MIN: CPT

## 2019-07-10 NOTE — DISCUSSION/SUMMARY
[FreeTextEntry1] : 78 W who has lewy body dementia. Will continue namenda. She will continue physical therapy. Order written. She will f/u in 4 months. \par \par Patient was advised to continue to monitor for neurologic symptoms and to notify my office or go to the nearest emergency room if there are any changes. Neurologic issues were discussed with the patient. All questions were answered to complete satisfaction. Education was provided to the patient during this encounter.\par Side effects of the above medications were discussed in detail including but not limited to applicable black box warning and teratogenicity as appropriate. \par Patient was advised to bring previous records to my office. \par \par

## 2019-07-10 NOTE — HISTORY OF PRESENT ILLNESS
[FreeTextEntry1] : 78 W who is here for initial consultation of memory loss that started about a year ago. She also has history of gait instability that was mentioned on a hct in 2014 that was unremarkable. When asked what she's forgetting, she pauses and mentions that the history is hard to remember. She lives by herself and has left the stove on. She eats 2 meals a day whereas she used to eat 3 meals. She eats chicken only for dinner. We started the conversation about an aide or assisted living but patient is very hesitant. She has recently showed depressed mood since the death of her mother 7 years ago and the moving out of her granddaughter 3 years ago. She feels lonely and isolated. She also experiences hallucinations especially at night. \par \par interval history: she is here for followup. Her neuropsych report was reviewed. She states her hallucinations have improved. She was started on aricept by Dr. Alicea. 3 weeks ago, she missed the seat and fell on the ground. Her tailbone and lower back is still painful at times. her labs from last visit was reviewed as well. She has cramps in her head and daughter via aunt wanted pet scan? \par \par interval history: she states she's doing well. She is tolerating namenda. She is off aricept. Her xray of spine and coccyx was neg for fracture. No further falls. She will continue physical therapy. Her daughter is trying to get services for her. She inquired about functional mri and was advised that at this time, it's not indicated.

## 2019-07-10 NOTE — PHYSICAL EXAM
[Oriented To Time, Place, And Person] : oriented to person, place, and time [General Appearance - Alert] : alert [Person] : oriented to person [Time] : oriented to time [Place] : oriented to place [Cranial Nerves Optic (II)] : visual acuity intact bilaterally,  visual fields full to confrontation, pupils equal round and reactive to light [Cranial Nerves Trigeminal (V)] : facial sensation intact symmetrically [Cranial Nerves Oculomotor (III)] : extraocular motion intact [Cranial Nerves Facial (VII)] : face symmetrical [Cranial Nerves Vestibulocochlear (VIII)] : hearing was intact bilaterally [Cranial Nerves Glossopharyngeal (IX)] : tongue and palate midline [Cranial Nerves Accessory (XI - Cranial And Spinal)] : head turning and shoulder shrug symmetric [Cranial Nerves Hypoglossal (XII)] : there was no tongue deviation with protrusion [Motor Tone] : muscle tone was normal in all four extremities [Motor Strength] : muscle strength was normal in all four extremities [Coordination - Dysmetria Impaired Finger-to-Nose Bilateral] : not present [Abnormal Walk] : normal gait [Sensation Tactile Decrease] : light touch was intact [1+] : Patella right 1+ [Extraocular Movements] : extraocular movements were intact [Optic Disc Abnormality] : the optic disc were normal in size and color [FreeTextEntry8] : normal base and swings both arms  [Hearing Threshold Finger Rub Not [place]] : hearing was normal [FreeTextEntry1] : see above [Full Pulse] : the pedal pulses are present [] : no rash

## 2019-07-12 ENCOUNTER — APPOINTMENT (OUTPATIENT)
Dept: INTERNAL MEDICINE | Facility: CLINIC | Age: 79
End: 2019-07-12
Payer: MEDICARE

## 2019-07-12 VITALS — WEIGHT: 158 LBS | HEIGHT: 67 IN | BODY MASS INDEX: 24.8 KG/M2

## 2019-07-12 VITALS — RESPIRATION RATE: 12 BRPM | DIASTOLIC BLOOD PRESSURE: 72 MMHG | SYSTOLIC BLOOD PRESSURE: 108 MMHG | HEART RATE: 68 BPM

## 2019-07-12 VITALS — SYSTOLIC BLOOD PRESSURE: 104 MMHG | DIASTOLIC BLOOD PRESSURE: 64 MMHG

## 2019-07-12 PROCEDURE — 99214 OFFICE O/P EST MOD 30 MIN: CPT

## 2019-07-12 NOTE — ASSESSMENT
[FreeTextEntry1] : Dementia, depression, anxiety. Hallucination in the past.   Saw Dr Harley.  MRI shows vasc change. Neuro psych report excellent .  Needs HHA/.  Dtr considering moving her in with her.  Has cameras that dtr watches.  TIA 1980's had Coumadin then ASA related to carotid artery. Cont  ASA.\par Cerumen R ear cleared.   Advised ENT Has seen Dr Ronquillo\par Anemia .  Had transfusion 2013 with neg EGD and colonoscopy  Hb normal.   \par HCVD. OK\par Hyperchol. Cont Zocor  \par DM HBA1C    5.7\par Cataracts.  Told 1 yr f/up\par Constip.  Seems OK\par Gait instability.  OA Hip  Surg rec  PT  \par Low Vit D.  Better\par  \par

## 2019-07-12 NOTE — HEALTH RISK ASSESSMENT
[] : No [Yes] : Yes [Monthly or less (1 pt)] : Monthly or less (1 point) [Never (0 pts)] : Never (0 points) [1 or 2 (0 pts)] : 1 or 2 (0 points) [No] : In the past 12 months have you used drugs other than those required for medical reasons? No [Audit-CScore] : 0

## 2019-07-12 NOTE — HISTORY OF PRESENT ILLNESS
[FreeTextEntry1] : F/up for DM HCVD chol low vit D   \par Eval of dementia with vasc changes on MRI.  \par Consults from neuro and neuropsych noted.  \par No recent falls  Getting PT\par OA hip ortho rec PT not ORIF\par Seems more alert and functional with better appetite and more activity\par She does not have a life alert.

## 2019-07-12 NOTE — PHYSICAL EXAM
[Well Nourished] : well nourished [No Acute Distress] : no acute distress [Well-Appearing] : well-appearing [Well Developed] : well developed [Normal Sclera/Conjunctiva] : normal sclera/conjunctiva [PERRL] : pupils equal round and reactive to light [EOMI] : extraocular movements intact [Normal Oropharynx] : the oropharynx was normal [Normal Outer Ear/Nose] : the outer ears and nose were normal in appearance [Normal TMs] : both tympanic membranes were normal [No JVD] : no jugular venous distention [Supple] : supple [No Lymphadenopathy] : no lymphadenopathy [No Respiratory Distress] : no respiratory distress  [Thyroid Normal, No Nodules] : the thyroid was normal and there were no nodules present [No Accessory Muscle Use] : no accessory muscle use [Clear to Auscultation] : lungs were clear to auscultation bilaterally [Normal Rate] : normal rate  [Normal S1, S2] : normal S1 and S2 [Regular Rhythm] : with a regular rhythm [No Murmur] : no murmur heard [No Carotid Bruits] : no carotid bruits [No Abdominal Bruit] : a ~M bruit was not heard ~T in the abdomen [No Varicosities] : no varicosities [Pedal Pulses Present] : the pedal pulses are present [No Edema] : there was no peripheral edema [No Palpable Aorta] : no palpable aorta [No Extremity Clubbing/Cyanosis] : no extremity clubbing/cyanosis [Soft] : abdomen soft [Non Tender] : non-tender [Non-distended] : non-distended [No HSM] : no HSM [No Masses] : no abdominal mass palpated [Normal Supraclavicular Nodes] : no supraclavicular lymphadenopathy [Normal Bowel Sounds] : normal bowel sounds [Normal Posterior Cervical Nodes] : no posterior cervical lymphadenopathy [Normal Anterior Cervical Nodes] : no anterior cervical lymphadenopathy [No CVA Tenderness] : no CVA  tenderness [No Spinal Tenderness] : no spinal tenderness [No Joint Swelling] : no joint swelling [Grossly Normal Strength/Tone] : grossly normal strength/tone [No Rash] : no rash [Coordination Grossly Intact] : coordination grossly intact [No Focal Deficits] : no focal deficits [Normal Gait] : normal gait [Deep Tendon Reflexes (DTR)] : deep tendon reflexes were 2+ and symmetric [Normal Affect] : the affect was normal [Normal Insight/Judgement] : insight and judgment were intact [Alert and Oriented x3] : oriented to person, place, and time [Comprehensive Foot Exam Normal] : Right and left foot were examined and both feet are normal. No ulcers in either foot. Toes are normal and with full ROM.  Normal tactile sensation with monofilament testing throughout both feet [de-identified] : Cerumen mod removed R  L is clear

## 2019-07-12 NOTE — REVIEW OF SYSTEMS
[Chest Pain] : no chest pain [Palpitations] : no palpitations [Melena] : no melena [Lower Ext Edema] : no lower extremity edema [Joint Pain] : joint pain [Back Pain] : no back pain [Muscle Pain] : no muscle pain [Headache] : no headache [Dizziness] : no dizziness [Fainting] : no fainting [Confusion] : confusion [Memory Loss] : memory loss [Unsteady Walking] : ataxia [Suicidal] : not suicidal [Depression] : depression [Anxiety] : anxiety [Negative] : Heme/Lymph [FreeTextEntry2] : see HPI [de-identified] : See HPI

## 2019-08-20 ENCOUNTER — FORM ENCOUNTER (OUTPATIENT)
Age: 79
End: 2019-08-20

## 2019-08-21 ENCOUNTER — OUTPATIENT (OUTPATIENT)
Dept: OUTPATIENT SERVICES | Facility: HOSPITAL | Age: 79
LOS: 1 days | End: 2019-08-21
Payer: MEDICARE

## 2019-08-21 ENCOUNTER — APPOINTMENT (OUTPATIENT)
Dept: MAMMOGRAPHY | Facility: IMAGING CENTER | Age: 79
End: 2019-08-21
Payer: MEDICARE

## 2019-08-21 ENCOUNTER — RX RENEWAL (OUTPATIENT)
Age: 79
End: 2019-08-21

## 2019-08-21 DIAGNOSIS — Z00.8 ENCOUNTER FOR OTHER GENERAL EXAMINATION: ICD-10-CM

## 2019-08-21 PROCEDURE — 77063 BREAST TOMOSYNTHESIS BI: CPT

## 2019-08-21 PROCEDURE — 77067 SCR MAMMO BI INCL CAD: CPT | Mod: 26

## 2019-08-21 PROCEDURE — 77067 SCR MAMMO BI INCL CAD: CPT

## 2019-08-21 PROCEDURE — 77063 BREAST TOMOSYNTHESIS BI: CPT | Mod: 26

## 2019-08-26 ENCOUNTER — RX RENEWAL (OUTPATIENT)
Age: 79
End: 2019-08-26

## 2019-08-26 DIAGNOSIS — Z00.00 ENCOUNTER FOR GENERAL ADULT MEDICAL EXAMINATION W/OUT ABNORMAL FINDINGS: ICD-10-CM

## 2019-08-28 ENCOUNTER — OUTPATIENT (OUTPATIENT)
Dept: OUTPATIENT SERVICES | Facility: HOSPITAL | Age: 79
LOS: 1 days | End: 2019-08-28

## 2019-08-28 DIAGNOSIS — Z00.8 ENCOUNTER FOR OTHER GENERAL EXAMINATION: ICD-10-CM

## 2019-09-05 ENCOUNTER — APPOINTMENT (OUTPATIENT)
Dept: NEUROLOGY | Facility: CLINIC | Age: 79
End: 2019-09-05

## 2019-09-20 ENCOUNTER — FORM ENCOUNTER (OUTPATIENT)
Age: 79
End: 2019-09-20

## 2019-09-21 ENCOUNTER — APPOINTMENT (OUTPATIENT)
Dept: ULTRASOUND IMAGING | Facility: IMAGING CENTER | Age: 79
End: 2019-09-21

## 2019-09-21 ENCOUNTER — APPOINTMENT (OUTPATIENT)
Dept: ULTRASOUND IMAGING | Facility: IMAGING CENTER | Age: 79
End: 2019-09-21
Payer: MEDICARE

## 2019-09-21 ENCOUNTER — OUTPATIENT (OUTPATIENT)
Dept: OUTPATIENT SERVICES | Facility: HOSPITAL | Age: 79
LOS: 1 days | End: 2019-09-21
Payer: MEDICARE

## 2019-09-21 DIAGNOSIS — R92.2 INCONCLUSIVE MAMMOGRAM: ICD-10-CM

## 2019-09-21 PROCEDURE — 76642 ULTRASOUND BREAST LIMITED: CPT

## 2019-09-21 PROCEDURE — 76642 ULTRASOUND BREAST LIMITED: CPT | Mod: 26,RT

## 2019-10-14 ENCOUNTER — APPOINTMENT (OUTPATIENT)
Dept: INTERNAL MEDICINE | Facility: CLINIC | Age: 79
End: 2019-10-14
Payer: MEDICARE

## 2019-10-14 VITALS — RESPIRATION RATE: 12 BRPM | SYSTOLIC BLOOD PRESSURE: 100 MMHG | HEART RATE: 66 BPM | DIASTOLIC BLOOD PRESSURE: 60 MMHG

## 2019-10-14 VITALS — HEIGHT: 67 IN | WEIGHT: 164 LBS | BODY MASS INDEX: 25.74 KG/M2

## 2019-10-14 DIAGNOSIS — Z23 ENCOUNTER FOR IMMUNIZATION: ICD-10-CM

## 2019-10-14 PROCEDURE — 99214 OFFICE O/P EST MOD 30 MIN: CPT | Mod: 25

## 2019-10-14 PROCEDURE — G0008: CPT

## 2019-10-14 PROCEDURE — 90686 IIV4 VACC NO PRSV 0.5 ML IM: CPT

## 2019-10-14 PROCEDURE — 36415 COLL VENOUS BLD VENIPUNCTURE: CPT

## 2019-10-14 NOTE — REVIEW OF SYSTEMS
[Memory Loss] : memory loss [Confusion] : confusion [Anxiety] : anxiety [Depression] : depression [Negative] : Heme/Lymph [Chest Pain] : no chest pain [Palpitations] : no palpitations [Lower Ext Edema] : no lower extremity edema [Melena] : no melena [Muscle Pain] : no muscle pain [Back Pain] : no back pain [Headache] : no headache [Dizziness] : no dizziness [Fainting] : no fainting [Suicidal] : not suicidal [FreeTextEntry2] : see HPI [de-identified] : See HPI

## 2019-10-14 NOTE — HEALTH RISK ASSESSMENT
[No] : In the past 12 months have you used drugs other than those required for medical reasons? No [No falls in past year] : Patient reported no falls in the past year [(PHQ-2) Unable to screen] : PHQ-2: unable to screen [] : No [Audit-CScore] : 0 [UnableToScreenReason] : Dementia

## 2019-10-14 NOTE — PHYSICAL EXAM
[Well Nourished] : well nourished [No Acute Distress] : no acute distress [PERRL] : pupils equal round and reactive to light [Well-Appearing] : well-appearing [Well Developed] : well developed [Normal Sclera/Conjunctiva] : normal sclera/conjunctiva [Normal Oropharynx] : the oropharynx was normal [Normal Outer Ear/Nose] : the outer ears and nose were normal in appearance [EOMI] : extraocular movements intact [Normal TMs] : both tympanic membranes were normal [No JVD] : no jugular venous distention [No Lymphadenopathy] : no lymphadenopathy [Supple] : supple [No Respiratory Distress] : no respiratory distress  [Thyroid Normal, No Nodules] : the thyroid was normal and there were no nodules present [Clear to Auscultation] : lungs were clear to auscultation bilaterally [No Accessory Muscle Use] : no accessory muscle use [Regular Rhythm] : with a regular rhythm [Normal Rate] : normal rate  [Normal S1, S2] : normal S1 and S2 [No Carotid Bruits] : no carotid bruits [No Murmur] : no murmur heard [No Abdominal Bruit] : a ~M bruit was not heard ~T in the abdomen [Pedal Pulses Present] : the pedal pulses are present [No Edema] : there was no peripheral edema [No Varicosities] : no varicosities [No Extremity Clubbing/Cyanosis] : no extremity clubbing/cyanosis [Soft] : abdomen soft [No Palpable Aorta] : no palpable aorta [Non-distended] : non-distended [Non Tender] : non-tender [No Masses] : no abdominal mass palpated [Normal Bowel Sounds] : normal bowel sounds [No HSM] : no HSM [Normal Anterior Cervical Nodes] : no anterior cervical lymphadenopathy [Normal Supraclavicular Nodes] : no supraclavicular lymphadenopathy [Normal Posterior Cervical Nodes] : no posterior cervical lymphadenopathy [No CVA Tenderness] : no CVA  tenderness [No Spinal Tenderness] : no spinal tenderness [Grossly Normal Strength/Tone] : grossly normal strength/tone [No Rash] : no rash [No Joint Swelling] : no joint swelling [Coordination Grossly Intact] : coordination grossly intact [No Focal Deficits] : no focal deficits [Normal Gait] : normal gait [Deep Tendon Reflexes (DTR)] : deep tendon reflexes were 2+ and symmetric [Normal Affect] : the affect was normal [Alert and Oriented x3] : oriented to person, place, and time [Normal Insight/Judgement] : insight and judgment were intact [Comprehensive Foot Exam Normal] : Right and left foot were examined and both feet are normal. No ulcers in either foot. Toes are normal and with full ROM.  Normal tactile sensation with monofilament testing throughout both feet [de-identified] : Cerumen mod removed R  L is clear

## 2019-10-14 NOTE — HISTORY OF PRESENT ILLNESS
[FreeTextEntry1] : F/up for DM HCVD chol low vit D   \par Eval of dementia with vasc changes on MRI.  \par Consults from neuro and neuropsych noted.  \par No recent falls  No longer getting PT\par OA hip ortho rec PT not ORIF\par Seems more alert and functional with better appetite and more activity\par She does not have a life alert.

## 2019-10-14 NOTE — ASSESSMENT
[FreeTextEntry1] : Dementia, depression, anxiety. Hallucination in the past.   Saw Dr Harley.  MRI shows vasc change. Neuro psych report excellent .  Needs HHA/.  Dtr considering moving her in with her.  Has cameras that dtr watches.  TIA 1980's had Coumadin then ASA related to carotid artery. Cont  ASA.\par Cerumen R ear again cleared.     Has seen Dr Ronquillo in past\par Anemia .  Had transfusion 2013 with neg EGD and colonoscopy  Hb normal.   \par HCVD. OK\par Hyperchol. Cont Zocor  \par DM HBA1C    5.7\par Cataracts.  Told 1 yr f/up\par Constip.  Seems OK\par Gait instability.  OA Hip  Surg rec  PT  \par Low Vit D.  Better\par  \par

## 2019-10-15 LAB
25(OH)D3 SERPL-MCNC: 31 NG/ML
ALBUMIN SERPL ELPH-MCNC: 4.3 G/DL
ALP BLD-CCNC: 80 U/L
ALT SERPL-CCNC: 12 U/L
ANION GAP SERPL CALC-SCNC: 17 MMOL/L
AST SERPL-CCNC: 16 U/L
BASOPHILS # BLD AUTO: 0.04 K/UL
BASOPHILS NFR BLD AUTO: 0.4 %
BILIRUB SERPL-MCNC: 0.3 MG/DL
BUN SERPL-MCNC: 17 MG/DL
CALCIUM SERPL-MCNC: 10 MG/DL
CHLORIDE SERPL-SCNC: 102 MMOL/L
CHOLEST SERPL-MCNC: 189 MG/DL
CHOLEST/HDLC SERPL: 2.9 RATIO
CO2 SERPL-SCNC: 23 MMOL/L
CREAT SERPL-MCNC: 1.04 MG/DL
EOSINOPHIL # BLD AUTO: 0.02 K/UL
EOSINOPHIL NFR BLD AUTO: 0.2 %
ESTIMATED AVERAGE GLUCOSE: 120 MG/DL
GLUCOSE SERPL-MCNC: 81 MG/DL
HBA1C MFR BLD HPLC: 5.8 %
HCT VFR BLD CALC: 43.2 %
HDLC SERPL-MCNC: 66 MG/DL
HGB BLD-MCNC: 12.9 G/DL
IMM GRANULOCYTES NFR BLD AUTO: 0.2 %
LDLC SERPL CALC-MCNC: 107 MG/DL
LYMPHOCYTES # BLD AUTO: 2.28 K/UL
LYMPHOCYTES NFR BLD AUTO: 25.1 %
MAN DIFF?: NORMAL
MCHC RBC-ENTMCNC: 29.9 GM/DL
MCHC RBC-ENTMCNC: 30.1 PG
MCV RBC AUTO: 100.9 FL
MONOCYTES # BLD AUTO: 0.61 K/UL
MONOCYTES NFR BLD AUTO: 6.7 %
NEUTROPHILS # BLD AUTO: 6.12 K/UL
NEUTROPHILS NFR BLD AUTO: 67.4 %
PLATELET # BLD AUTO: 246 K/UL
POTASSIUM SERPL-SCNC: 4.4 MMOL/L
PROT SERPL-MCNC: 7.3 G/DL
RBC # BLD: 4.28 M/UL
RBC # FLD: 13 %
SODIUM SERPL-SCNC: 142 MMOL/L
TRIGL SERPL-MCNC: 80 MG/DL
TSH SERPL-ACNC: 2.08 UIU/ML
WBC # FLD AUTO: 9.09 K/UL

## 2019-10-25 ENCOUNTER — INPATIENT (INPATIENT)
Facility: HOSPITAL | Age: 79
LOS: 1 days | Discharge: HOME HEALTH | End: 2019-10-27
Attending: HOSPITALIST | Admitting: HOSPITALIST
Payer: MEDICARE

## 2019-10-25 VITALS
HEIGHT: 68 IN | RESPIRATION RATE: 15 BRPM | DIASTOLIC BLOOD PRESSURE: 66 MMHG | HEART RATE: 87 BPM | TEMPERATURE: 98 F | OXYGEN SATURATION: 97 % | WEIGHT: 164.02 LBS | SYSTOLIC BLOOD PRESSURE: 125 MMHG

## 2019-10-25 LAB
ALBUMIN SERPL ELPH-MCNC: 3.5 G/DL — SIGNIFICANT CHANGE UP (ref 3.3–5)
ALP SERPL-CCNC: 79 U/L — SIGNIFICANT CHANGE UP (ref 40–120)
ALT FLD-CCNC: 18 U/L — SIGNIFICANT CHANGE UP (ref 12–78)
ANION GAP SERPL CALC-SCNC: 8 MMOL/L — SIGNIFICANT CHANGE UP (ref 5–17)
APTT BLD: 28.8 SEC — SIGNIFICANT CHANGE UP (ref 27.5–36.3)
AST SERPL-CCNC: 16 U/L — SIGNIFICANT CHANGE UP (ref 15–37)
BILIRUB SERPL-MCNC: 0.3 MG/DL — SIGNIFICANT CHANGE UP (ref 0.2–1.2)
BUN SERPL-MCNC: 21 MG/DL — SIGNIFICANT CHANGE UP (ref 7–23)
CALCIUM SERPL-MCNC: 9.1 MG/DL — SIGNIFICANT CHANGE UP (ref 8.5–10.1)
CHLORIDE SERPL-SCNC: 106 MMOL/L — SIGNIFICANT CHANGE UP (ref 96–108)
CK MB BLD-MCNC: <2.2 % — SIGNIFICANT CHANGE UP (ref 0–3.5)
CK MB CFR SERPL CALC: <1 NG/ML — SIGNIFICANT CHANGE UP (ref 0.5–3.6)
CK SERPL-CCNC: 46 U/L — SIGNIFICANT CHANGE UP (ref 26–192)
CO2 SERPL-SCNC: 27 MMOL/L — SIGNIFICANT CHANGE UP (ref 22–31)
CREAT SERPL-MCNC: 1.27 MG/DL — SIGNIFICANT CHANGE UP (ref 0.5–1.3)
GLUCOSE SERPL-MCNC: 90 MG/DL — SIGNIFICANT CHANGE UP (ref 70–99)
HCT VFR BLD CALC: 42 % — SIGNIFICANT CHANGE UP (ref 34.5–45)
HGB BLD-MCNC: 13.1 G/DL — SIGNIFICANT CHANGE UP (ref 11.5–15.5)
INR BLD: 1.03 RATIO — SIGNIFICANT CHANGE UP (ref 0.88–1.16)
MCHC RBC-ENTMCNC: 30.5 PG — SIGNIFICANT CHANGE UP (ref 27–34)
MCHC RBC-ENTMCNC: 31.2 GM/DL — LOW (ref 32–36)
MCV RBC AUTO: 97.7 FL — SIGNIFICANT CHANGE UP (ref 80–100)
NRBC # BLD: 0 /100 WBCS — SIGNIFICANT CHANGE UP (ref 0–0)
PLATELET # BLD AUTO: 247 K/UL — SIGNIFICANT CHANGE UP (ref 150–400)
POTASSIUM SERPL-MCNC: 4.6 MMOL/L — SIGNIFICANT CHANGE UP (ref 3.5–5.3)
POTASSIUM SERPL-SCNC: 4.6 MMOL/L — SIGNIFICANT CHANGE UP (ref 3.5–5.3)
PROT SERPL-MCNC: 7.4 GM/DL — SIGNIFICANT CHANGE UP (ref 6–8.3)
PROTHROM AB SERPL-ACNC: 11.5 SEC — SIGNIFICANT CHANGE UP (ref 10–12.9)
RBC # BLD: 4.3 M/UL — SIGNIFICANT CHANGE UP (ref 3.8–5.2)
RBC # FLD: 12.8 % — SIGNIFICANT CHANGE UP (ref 10.3–14.5)
SODIUM SERPL-SCNC: 141 MMOL/L — SIGNIFICANT CHANGE UP (ref 135–145)
TROPONIN I SERPL-MCNC: <.015 NG/ML — SIGNIFICANT CHANGE UP (ref 0.01–0.04)
WBC # BLD: 12.68 K/UL — HIGH (ref 3.8–10.5)
WBC # FLD AUTO: 12.68 K/UL — HIGH (ref 3.8–10.5)

## 2019-10-25 PROCEDURE — 70450 CT HEAD/BRAIN W/O DYE: CPT | Mod: 26

## 2019-10-25 PROCEDURE — 99285 EMERGENCY DEPT VISIT HI MDM: CPT

## 2019-10-25 PROCEDURE — 93010 ELECTROCARDIOGRAM REPORT: CPT

## 2019-10-25 PROCEDURE — 76377 3D RENDER W/INTRP POSTPROCES: CPT | Mod: 26

## 2019-10-25 PROCEDURE — 71045 X-RAY EXAM CHEST 1 VIEW: CPT | Mod: 26

## 2019-10-25 PROCEDURE — 72125 CT NECK SPINE W/O DYE: CPT | Mod: 26

## 2019-10-25 PROCEDURE — 99223 1ST HOSP IP/OBS HIGH 75: CPT

## 2019-10-25 RX ORDER — SIMVASTATIN 20 MG/1
20 TABLET, FILM COATED ORAL AT BEDTIME
Refills: 0 | Status: DISCONTINUED | OUTPATIENT
Start: 2019-10-25 | End: 2019-10-27

## 2019-10-25 RX ORDER — METOPROLOL TARTRATE 50 MG
25 TABLET ORAL DAILY
Refills: 0 | Status: DISCONTINUED | OUTPATIENT
Start: 2019-10-25 | End: 2019-10-26

## 2019-10-25 RX ADMIN — SIMVASTATIN 20 MILLIGRAM(S): 20 TABLET, FILM COATED ORAL at 22:21

## 2019-10-25 NOTE — H&P ADULT - NSHPLABSRESULTS_GEN_ALL_CORE
13.1   12.68 )-----------( 247      ( 25 Oct 2019 17:24 )             42.0     10-25    141  |  106  |  21  ----------------------------<  90  4.6   |  27  |  1.27    Ca    9.1      25 Oct 2019 17:24    TPro  7.4  /  Alb  3.5  /  TBili  0.3  /  DBili  x   /  AST  16  /  ALT  18  /  AlkPhos  79  10-25    PT/INR - ( 25 Oct 2019 17:24 )   PT: 11.5 sec;   INR: 1.03 ratio         PTT - ( 25 Oct 2019 17:24 )  PTT:28.8 sec

## 2019-10-25 NOTE — H&P ADULT - HISTORY OF PRESENT ILLNESS
77 y/o female with htn, hld lewy body dementia? presents with witnessed fall and hit her head. Denies LOC or seizure. Pt was standing and suddenly fell. Unknown if she passed out and she doesnt remember. PT is a poor historian. Same thing happened last week. Pt has been having workseing memory and was recently diagnosed with lewy body dementia?     ED: VSS. head CT shows no acute abnormalities

## 2019-10-25 NOTE — ED ADULT NURSE NOTE - OBJECTIVE STATEMENT
Pt A&Ox3, states she was standing then fell, denies any dizziness prior to falling. pt denies any loc with fall. Pt states she had slight chest discomfort prior to falling, denies at this time. Labs sent, CT scan done. plan of care discussed, pt verbalize understanding.

## 2019-10-25 NOTE — PATIENT PROFILE ADULT - NSPROSPHOSPCHAPLAINYN_GEN_A_NUR
ART العلي  : 1960  ACCOUNT:  [id number]  [phone]  PCP:  None   TODAY'S DATE: 2018  DICTATED BY:  [Dr. Quinn Romano]      CHIEF COMPLAINT: [Palpitations, dizziness, tachycardia.]    HPI:    [On 2018, Art العلي, a 58 year old female, presented with presyncope and rapid heart beat.]  She is referred by Dr. Mcghee for evaluation of an abnormal monitor result and related symptoms.    She states for a little over 4 years, she has had episodes of palpitations and dizziness.  These last for just a few seconds at a time.  She had seen her father's cardiologist, who performed an ECG, which was normal.  No further evaluation was done.  About every 3-4 weeks, she would have 1 of these episodes.  Then, this summer she had some increased stress with a family member's illness, and she developed abrupt episode of chest pain and dizziness, correlating with her palpitations.  These have now continued once every couple weeks and feel more severe.  She is found to have SVT associated with these, and thus was started on metoprolol.  Since then, her symptoms have improved slightly, with less severity, but the same frequency.  She reports no syncope.    RISK FACTORS:  CAD - Weight Family    REVIEW OF SYSTEMS:    CONS: no fever, chills, or recent weight changes. EYES: denies significant visual changes. ENMT: denies difficulties with hearing, otherwise negative. CV: see HPI; denies claudication. RESP: denies chronic cough, hemoptysis. GI: denies melena, hematochezia. : no hematuria. INTEG: no new rashes, lesions. MS: no limiting arthritis. NEURO: no localized deficits. HEM/LYMPH: denies easy bruising. ALL: no new food or enviornmental allergies.      PAST HISTORY: unremarkable    PAST CV HISTORY: PSVT, NSVT    FAMILY HISTORY: Significant for premature CAD.  SOCIAL HISTORY: SMOKING: Never used tobacco. denies smoking. CAFFEINE: none. ALCOHOL: denies drinking. EXERCISE: active work. DIET: low fat,low cholesterol.  MARITAL STATUS: . LIFESTYLE: low stress lifestyle. SEXUAL: did not discuss sexual history. OCCUPATION: counselor. RESIDENCE: lives at home with family. ILLICIT DRUG USE: denies use of street drugs.     ALLERGIES: No Known Allergies    MEDICATIONS: Selected prescriptions see below    VITAL SIGNS: [B/P - 110/64 , Pulse - 54, Weight -  196, Height -   65 , BMI - 32.6 ]    CONS: well developed, well nourished. WEIGHT: BMI parameters reviewed and discussed. HEAD/FACE: no trauma and normocephalic. EYES: conjunctivae not injected and no xanthelasma. NECK: jugular venous pressure not elevated. RESP: respirations with normal rate and rhythm, clear to auscultation. GI: no masses, tenderness or hepatosplenomegaly, rectal deferred. MS: adequate gait for exercise/testing. EXT: no clubbing or cyanosis.  SKIN: no rashes, lesions, ulcers.  NEURO/PSYCH: alert and oriented to time, place and person and normal affect.      CV: PALP: no thrills, lifts. AUSC:  regular rhythm, normal S1, S2 without S3; no pathologic murmurs. CAROTIDS: carotid pulses normal and without bruits. PEDAL: pedal pulses intact. EXT: no peripheral edema.     LABORATORY DATA: [ECG from August 29, 2018 shows sinus bradycardia 57 bpm, , ,  ms, no preexcitation, normal repolarization characteristics.      Echocardiogram and stress echocardiogram from [facility] in August were reportedly normal.    Event monitor transmissions started September 13, 2018, and I reviewed strips from September 13 and October 7, showing episodes of PACs, brief SVT, likely atrial runs, and an 8-beat run of a wide complex tachycardia which may be NSVT or likely SVT with aberrancy.]    DECISION MAKING: The patient is a 58-year-old with 4 or more years of palpitations associated with dizziness, with an increase in severity and frequency of the symptoms since summer.  They are now associated with chest pain.  They have correlated with episodes of SVT,  and the symptoms have improved on metoprolol.  On metoprolol, however event monitoring continues to show brief paroxysms of SVT, as well as a wide complex tachycardia that may be an SVT, but is more likely SVT with aberrancy.    Dr. Mcghee ordered a coronary CTA, the results should be back later today.  If that is normal, I recommend 1 of 2 things.  We can adjust her medications, likely decrease metoprolol dose and adding an antiarrhythmic agent.  The other option would be an EP study with possible ablation of SVT.    We discussed SVT ablation, including the goals, alternatives, and risks including hemorrhage, infection, femoral access complications requiring surgical repair, DVT/PE, effusion/tamponade, anesthesia complications.  I provided education material from the [employer] regarding EP study and ablation.    She will review these options, and after the coronary CTA results are available we will finalize these plans.     Thank you for the consultation.    ASSESSMENT:  1. Chest pain, precordial  2. Dizziness  3. Supraventricular tachycardia  4. Possible ventricular tachycardia, nonsustained  5. Palpitations    PLAN:  [1.  Review results of the coronary CTA scan.  2.  Finalize plans with Dr. Romano regarding treatment of your arrhythmias.  3.  Copy Dr. Mcghee.]    PRESCRIPTIONS:   11/08/18 *Metoprolol Tartrate  50MG      one tablet at bedtime and one tablet     08/29/18 *Metoprolol Succinate 50MG      1 TABLET BY MOUTH DAILY                  10/31/18 Losartan Potassium    50MG      one daily                                08/29/18 Aspirin Adult Low [id number]      one daily                                           no

## 2019-10-25 NOTE — ED PROVIDER NOTE - OBJECTIVE STATEMENT
78 year old female presents today biba accompanied with family c/o left hip pain, pt fell backwards landing onto her left side, since then she has not been able to ambulate, she recently returned home from rehab, she has known h/o severe arthritis in the left hip and told she needs a hip replacement +head contusion (-) LOC (-) headache (-) dizzy or lightheaded

## 2019-10-25 NOTE — H&P ADULT - ASSESSMENT
77 y/o female with htn, hld lewy body dementia? presents with witnessed fall and hit her head. Denies LOC or seizure. This is the second time in last week. Pt cannot remember if she synopsized or the incident d.t her dementia     Fall/syncope   - monitor on tele   -get neuro consult.  - pt consult   - check orthostatics     HLD   - statin     htn   - BB    dvt ppx

## 2019-10-25 NOTE — H&P ADULT - NSHPPHYSICALEXAM_GEN_ALL_CORE
GENERAL: NAD, well-groomed, well-developed  HEAD:  Atraumatic, Normocephalic  EYES: EOMI, PERRLA, conjunctiva and sclera clear  ENMT: No tonsillar erythema, exudates, or enlargement; Moist mucous membranes, No lesions  NECK: Supple, No JVD, Normal thyroid  NERVOUS SYSTEM:  Alert & Oriented X3, Good concentration DTRs 2+ intact and symmetric  CHEST/LUNG: Clear to percussion bilaterally; No rales, rhonchi, wheezing, or rubs  HEART: Regular rate and rhythm; No murmurs, rubs, or gallops  ABDOMEN: Soft, Nontender, Nondistended; Bowel sounds present  EXTREMITIES:normal pulses, no focal deficits. strength intact   LYMPH: No lymphadenopathy noted

## 2019-10-25 NOTE — ED ADULT TRIAGE NOTE - CHIEF COMPLAINT QUOTE
fall backwards  today from standing position witness by sister. Pt hit her head on the floor. Pt did not loss consciousness

## 2019-10-26 LAB
ANION GAP SERPL CALC-SCNC: 5 MMOL/L — SIGNIFICANT CHANGE UP (ref 5–17)
BUN SERPL-MCNC: 19 MG/DL — SIGNIFICANT CHANGE UP (ref 7–23)
CALCIUM SERPL-MCNC: 9.1 MG/DL — SIGNIFICANT CHANGE UP (ref 8.5–10.1)
CHLORIDE SERPL-SCNC: 108 MMOL/L — SIGNIFICANT CHANGE UP (ref 96–108)
CO2 SERPL-SCNC: 27 MMOL/L — SIGNIFICANT CHANGE UP (ref 22–31)
CREAT SERPL-MCNC: 0.99 MG/DL — SIGNIFICANT CHANGE UP (ref 0.5–1.3)
GLUCOSE SERPL-MCNC: 92 MG/DL — SIGNIFICANT CHANGE UP (ref 70–99)
HCT VFR BLD CALC: 38.7 % — SIGNIFICANT CHANGE UP (ref 34.5–45)
HGB BLD-MCNC: 12.1 G/DL — SIGNIFICANT CHANGE UP (ref 11.5–15.5)
MCHC RBC-ENTMCNC: 30.2 PG — SIGNIFICANT CHANGE UP (ref 27–34)
MCHC RBC-ENTMCNC: 31.3 GM/DL — LOW (ref 32–36)
MCV RBC AUTO: 96.5 FL — SIGNIFICANT CHANGE UP (ref 80–100)
NRBC # BLD: 0 /100 WBCS — SIGNIFICANT CHANGE UP (ref 0–0)
PLATELET # BLD AUTO: 220 K/UL — SIGNIFICANT CHANGE UP (ref 150–400)
POTASSIUM SERPL-MCNC: 4.1 MMOL/L — SIGNIFICANT CHANGE UP (ref 3.5–5.3)
POTASSIUM SERPL-SCNC: 4.1 MMOL/L — SIGNIFICANT CHANGE UP (ref 3.5–5.3)
RBC # BLD: 4.01 M/UL — SIGNIFICANT CHANGE UP (ref 3.8–5.2)
RBC # FLD: 12.8 % — SIGNIFICANT CHANGE UP (ref 10.3–14.5)
SODIUM SERPL-SCNC: 140 MMOL/L — SIGNIFICANT CHANGE UP (ref 135–145)
WBC # BLD: 9.29 K/UL — SIGNIFICANT CHANGE UP (ref 3.8–10.5)
WBC # FLD AUTO: 9.29 K/UL — SIGNIFICANT CHANGE UP (ref 3.8–10.5)

## 2019-10-26 PROCEDURE — 99233 SBSQ HOSP IP/OBS HIGH 50: CPT

## 2019-10-26 RX ORDER — AMLODIPINE BESYLATE 2.5 MG/1
2.5 TABLET ORAL DAILY
Refills: 0 | Status: DISCONTINUED | OUTPATIENT
Start: 2019-10-26 | End: 2019-10-27

## 2019-10-26 RX ADMIN — SIMVASTATIN 20 MILLIGRAM(S): 20 TABLET, FILM COATED ORAL at 22:37

## 2019-10-26 RX ADMIN — AMLODIPINE BESYLATE 2.5 MILLIGRAM(S): 2.5 TABLET ORAL at 18:02

## 2019-10-26 NOTE — CONSULT NOTE ADULT - SUBJECTIVE AND OBJECTIVE BOX
78 FEMALE   DEMENTIA  FREQUENT FALLS   ON BETA BLOCKER FOR BLOOD PRESSURE     ECG NORMAL SINUS RHYTHM SMALL INF Q'S NONSPECIFIC ST T WAVE CHANGES   TELEMETRY: SINUS BRADYCARDIA 50'S  HEMODYNAMICALLY STABLE HR 60-80  NO COMPLAINTS;NO JVD;CLEAR LUNGS; REGULAR RATE & RHYTHM, NORMAL S1& S2, NO SIGNIFICANT MURMURS; NON TENDER ABDOMEN; NO EDEMA     CT HEAD NEG  TROPONIN(S) NEG    IMPRESSION:    SINUS BRADYCARDIA / HEMODYNAMICALLY STABLE  I DOUBT BRADYCARDIA RELATED TO FALLS; NONETHELESS, WILL STOP BETA BLOCKER IF BRADYCARDIA A CONTRIBUTING FACTOR  CHANGE TO NORVASC 2.5 QD    FAMILY PRESENT AT BEDSIDE     ROSANNA TATE MD, FACC;

## 2019-10-26 NOTE — CONSULT NOTE ADULT - ASSESSMENT
Subjective Complaints:      Consult requested by ER doctor:                  Attending:     History of Present Illness:  Chief Complaint/Reason for Admission:  History of Present Illness:  HPI:  79 y/o female with htn, hld lewy body dementia? presents with witnessed fall and hit her head. Denies LOC or seizure. Pt was standing and suddenly fell. Unknown if she passed out and she doesnt remember. PT is a poor historian. Same thing happened last week. Pt has been having workseing memory and was recently diagnosed with lewy body dementia?     ED: VSS. head CT shows no acute abnormalities (25 Oct 2019 19:50)        PAST MEDICAL & SURGICAL HISTORY:  HTN (hypertension)  Hypercholesteremia  Anemia  No significant past surgical history  78yFemale    MEDICATIONS  (STANDING):  amLODIPine   Tablet 2.5 milliGRAM(s) Oral daily  simvastatin 20 milliGRAM(s) Oral at bedtime    MEDICATIONS  (PRN):      Allergies    penicillin (Hives)    Intolerances      FAMILY HISTORY:      REVIEW OF SYSTEMS:  General:  No wt loss, fevers, chills, night sweats  Eyes:  Good vision, no reported pain  ENT:  No sore throat, pain, runny nose, dysphagia  CV:  No pain, palpitatioins, hypo/hypertension  Resp:  No dyspnea, cough, tachypnea, wheezing  GI:  No pain, nausea, vomiting, diarrhea, constipatiion  :  No pain, bleeding, incontinence, nocturia  Muscle:  No pain, weakness  Breast:  No pain, abscess, mass, discharge  Neuro:  No weakness, tingling, memory problems  Psych:  No fatigue, insomnia, mood problems, depression  Endocrine:  No polyuria, polydypsia, cold/heat intolerance  Heme:  No petechiae, ecchymosis, easy bruisability  Skin:  No rash, tattoos, scars, edema      Vital Signs Last 24 Hrs  T(C): 37.2 (26 Oct 2019 17:00), Max: 37.7 (26 Oct 2019 05:27)  T(F): 99 (26 Oct 2019 17:00), Max: 99.9 (26 Oct 2019 05:27)  HR: 65 (26 Oct 2019 17:00) (63 - 84)  BP: 142/79 (26 Oct 2019 17:00) (125/70 - 166/74)  BP(mean): --  RR: 18 (26 Oct 2019 17:00) (17 - 18)  SpO2: 98% (26 Oct 2019 17:00) (98% - 99%)    GENERAL PHYSICAL EXAM:  General:  Appears stated age, well-groomed, well-nourished, no distress  HEENT:  NC/AT, patent nares w/ pink mucosa, OP clear w/o lesions, PERRL, EOMI, conjunctivae clear, no thyromegaly, nodules, adenopathy, no JVD  Chest:  Full & symmetric excursion, no increased effort, breath sounds clear  Cardiovascular:  Regular rhythm, S1, S2, no murmur/rub/S3/S4, no carotid/femoral/abdominal bruit, radial/pedal pulses 2+, no edema  Abdomen:  Soft, non-tender, non-distended, normoactive bowel sounds, no HSM  Extremities:  Gait & station:   Digits:   Nails:   Joints, Bones, Muscles:   ROM:   Stability:  Skin:  No rash/erythema/ecchymoses/petechiae/wounds/abscess/warm/dry  Musculoskeletal:  Full ROM in all joints w/o swelling/tenderness/effusion    NEUROLOGICAL EXAM:  HENT:  Normocephalic head; atraumatic head.  Neck supple.  ENT: normal looking.  Mental State:    Alert.  Fully oriented to person, place and date.  Coherent.  Speech clear and intact.  Cooperative.  Responds appropriately.    Cranial Nerves:  II-XII:   Pupils round and reactive to light and accommodation.  Extraocular movements full.  Visual fields full (no homonymous hemianopsia).  Visual acuity wnl.  Facial symmetry intact.  Tongue midline.  Motor Functions:  Moves all extremities.  No pronator drift of UE.  Claps hand well.  Hand  intact bilaterally.  Ambulatory.    Sensory Functions:   Intact to touch and pinprick to face and extremities.    Reflexes:  Deep tendon reflexes normoactive to biceps, knees and ankles.  Babinski absent (present).  Cerebellar Testing:    Finger to nose intact.  Nystagmus absent.  Neurovascular: Carotid auscultation full without bruits.      LABS:                        12.1   9.29  )-----------( 220      ( 26 Oct 2019 07:18 )             38.7     10-26    140  |  108  |  19  ----------------------------<  92  4.1   |  27  |  0.99    Ca    9.1      26 Oct 2019 07:18    TPro  7.4  /  Alb  3.5  /  TBili  0.3  /  DBili  x   /  AST  16  /  ALT  18  /  AlkPhos  79  10-25    PT/INR - ( 25 Oct 2019 17:24 )   PT: 11.5 sec;   INR: 1.03 ratio         PTT - ( 25 Oct 2019 17:24 )  PTT:28.8 sec        RADIOLOGY & ADDITIONAL STUDIES:      Assessment & Opinion:events noted  s/p fall syncope hx of  dementia  follows commands arm leg 4/5 ct head no acute path no seziure  non fcocla exam  will folow     Recommendations:  Brain MRI.  Carotid doppler.  Echocardiogram.  EEG.   DVT prophylaxis as ordered.  Medications:

## 2019-10-26 NOTE — PROGRESS NOTE ADULT - ASSESSMENT
79 y/o female with htn, hld lewy body dementia? presents with witnessed fall and hit her head. Denies LOC or seizure. This is the second time in last week. Pt cannot remember if she synopsized or the incident d.t her dementia     Fall/syncope   - sinus jazz on tele   -pending neuro consult.  - f/u pt consult   - ++ orthostatics. likely the cause of fall. will  the family and pt on getting up slowly     HLD   - statin     htn   -hold BB d,t bradycardia as is could be contributing to the orthostatics    dvt ppx

## 2019-10-26 NOTE — PROGRESS NOTE ADULT - SUBJECTIVE AND OBJECTIVE BOX
Patient is a 78y old  Female who presents with a chief complaint of     INTERVAL HPI/OVERNIGHT EVENTS:    MEDICATIONS  (STANDING):  amLODIPine   Tablet 2.5 milliGRAM(s) Oral daily  simvastatin 20 milliGRAM(s) Oral at bedtime    MEDICATIONS  (PRN):      Allergies    penicillin (Hives)    Intolerances        REVIEW OF SYSTEMS:  CONSTITUTIONAL: No fever, weight loss, or fatigue  EYES: No eye pain, visual disturbances, or discharge  ENMT:  No difficulty hearing, tinnitus, vertigo; No sinus or throat pain  NECK: No pain or stiffness  BREASTS: No pain, masses, or nipple discharge  RESPIRATORY: No cough, wheezing, chills or hemoptysis; No shortness of breath  CARDIOVASCULAR: No chest pain, palpitations, dizziness, or leg swelling  GASTROINTESTINAL: No abdominal or epigastric pain. No nausea, vomiting, or hematemesis; No diarrhea or constipation. No melena or hematochezia.  GENITOURINARY: No dysuria, frequency, hematuria, or incontinence  NEUROLOGICAL: No headaches, memory loss, loss of strength, numbness, or tremors  SKIN: No itching, burning, rashes, or lesions   LYMPH NODES: No enlarged glands  ENDOCRINE: No heat or cold intolerance; No hair loss  MUSCULOSKELETAL: No joint pain or swelling; No muscle, back, or extremity pain  PSYCHIATRIC: No depression, anxiety, mood swings, or difficulty sleeping  HEME/LYMPH: No easy bruising, or bleeding gums  ALLERGY AND IMMUNOLOGIC: No hives or eczema    Vital Signs Last 24 Hrs  T(C): 36.8 (26 Oct 2019 11:38), Max: 37.7 (26 Oct 2019 05:27)  T(F): 98.3 (26 Oct 2019 11:38), Max: 99.9 (26 Oct 2019 05:27)  HR: 69 (26 Oct 2019 11:38) (63 - 87)  BP: 125/70 (26 Oct 2019 11:38) (117/69 - 166/74)  BP(mean): --  RR: 17 (26 Oct 2019 11:38) (15 - 18)  SpO2: 99% (26 Oct 2019 11:38) (97% - 99%)    PHYSICAL EXAM:  GENERAL: NAD, well-groomed, well-developed  HEAD:  Atraumatic, Normocephalic  EYES: EOMI, PERRLA, conjunctiva and sclera clear  ENMT: No tonsillar erythema, exudates, or enlargement; Moist mucous membranes, Good dentition, No lesions  NECK: Supple, No JVD, Normal thyroid  NERVOUS SYSTEM:  Alert & Oriented X3, Good concentration; Motor Strength 5/5 B/L upper and lower extremities; DTRs 2+ intact and symmetric  CHEST/LUNG: Clear to percussion bilaterally; No rales, rhonchi, wheezing, or rubs  HEART: Regular rate and rhythm; No murmurs, rubs, or gallops  ABDOMEN: Soft, Nontender, Nondistended; Bowel sounds present  EXTREMITIES:  2+ Peripheral Pulses, No clubbing, cyanosis, or edema  LYMPH: No lymphadenopathy noted  SKIN: No rashes or lesions    LABS:                        12.1   9.29  )-----------( 220      ( 26 Oct 2019 07:18 )             38.7     10-26    140  |  108  |  19  ----------------------------<  92  4.1   |  27  |  0.99    Ca    9.1      26 Oct 2019 07:18    TPro  7.4  /  Alb  3.5  /  TBili  0.3  /  DBili  x   /  AST  16  /  ALT  18  /  AlkPhos  79  10-25    PT/INR - ( 25 Oct 2019 17:24 )   PT: 11.5 sec;   INR: 1.03 ratio         PTT - ( 25 Oct 2019 17:24 )  PTT:28.8 sec    CAPILLARY BLOOD GLUCOSE          RADIOLOGY & ADDITIONAL TESTS:    Imaging Personally Reviewed:  [ ] YES  [ ] NO    Consultant(s) Notes Reviewed:  [ ] YES  [ ] NO    Care Discussed with Consultants/Other Providers [ ] YES  [ ] NO

## 2019-10-26 NOTE — PHYSICAL THERAPY INITIAL EVALUATION ADULT - ADDITIONAL COMMENTS
Pt is a poor historian. Pt states she lives alone in an apartment with stairs to climb. Ambulates with a RW and is independent with ADLS

## 2019-10-27 ENCOUNTER — TRANSCRIPTION ENCOUNTER (OUTPATIENT)
Age: 79
End: 2019-10-27

## 2019-10-27 VITALS
SYSTOLIC BLOOD PRESSURE: 118 MMHG | DIASTOLIC BLOOD PRESSURE: 55 MMHG | TEMPERATURE: 98 F | HEART RATE: 73 BPM | RESPIRATION RATE: 17 BRPM

## 2019-10-27 PROCEDURE — 70551 MRI BRAIN STEM W/O DYE: CPT | Mod: 26

## 2019-10-27 PROCEDURE — 99239 HOSP IP/OBS DSCHRG MGMT >30: CPT

## 2019-10-27 RX ORDER — METOPROLOL TARTRATE 50 MG
1 TABLET ORAL
Qty: 0 | Refills: 0 | DISCHARGE

## 2019-10-27 RX ORDER — AMLODIPINE BESYLATE 2.5 MG/1
1 TABLET ORAL
Qty: 30 | Refills: 0
Start: 2019-10-27 | End: 2019-11-25

## 2019-10-27 RX ADMIN — AMLODIPINE BESYLATE 2.5 MILLIGRAM(S): 2.5 TABLET ORAL at 05:14

## 2019-10-27 NOTE — DISCHARGE NOTE PROVIDER - HOSPITAL COURSE
HPI:    79 y/o female with htn, hld lewy body dementia? presents with witnessed fall and hit her head. Denies LOC or seizure. Pt was standing and suddenly fell. Unknown if she passed out and she doesnt remember. Same thing happened last week. Pt has been having worsening memory and was recently diagnosed with lewy body dementia?         ED: VSS. head CT shows no acute abnormalities (25 Oct 2019 19:50)     Denies LOC or seizure. This is the second time in last week. Pt cannot remember if she synopsized or the incident d.t her dementia         Fall/syncope     - sinus jazz on tele     - will get home physical therapy    - ++ orthostatics. likely the cause of fall vs bradycayrdia.  will  the family and pt on getting up slowly and can follow up with cardiologist Dr. Miranda     - MRI negative for acute findings             HLD     - statin         htn     -hold BB d,t bradycardia as is could be contributing to the orthostatics    - start amlodpine     -     < from: MR Head No Cont (10.27.19 @ 10:40) >        FINDINGS:    HEMISPHERES:  No diffusion restriction or MR evidence of acute ischemia     is noted. Chronic ischemic changes are noted the in the white matter of     both hemispheres, most prevalent in the peritrigonal region bilaterally.     A chronic cyst is noted in the left basal ganglia. No microhemorrhage or     mass is noted.    VENTRICLES:  midline and normal in size    POSTERIOR FOSSA:  There is a extrinsic deformity of the left medulla by     tortuous vertebral artery. No ischemic changes are noted    EXTRA-AXIAL:  No mass or collections are depicted.    CRANIAL NERVES:  No abnormality noted.    VASCULATURE:  The major vessels and venous sinuses are grossly patent.      SINUSES AND MASTOIDS:  Clear.    MISCELLANEOUS:  No orbital or pituitary abnormality noted.  No skull base     lesion suggested.        IMPRESSION:        1)  chronic ischemic changes in the white matter of both hemispheres with     volume loss. No diffusion restriction or MR evidence of acute ischemia..    2)  clear sinuses and mastoids..         < end of copied text >

## 2019-10-27 NOTE — PROGRESS NOTE ADULT - SUBJECTIVE AND OBJECTIVE BOX
10/26/19  78 FEMALE   DEMENTIA  FREQUENT FALLS   ON BETA BLOCKER FOR BLOOD PRESSURE     ECG NORMAL SINUS RHYTHM SMALL INF Q'S NONSPECIFIC ST T WAVE CHANGES   TELEMETRY: SINUS BRADYCARDIA 50'S  HEMODYNAMICALLY STABLE HR 60-80  NO COMPLAINTS;NO JVD;CLEAR LUNGS; REGULAR RATE & RHYTHM, NORMAL S1& S2, NO SIGNIFICANT MURMURS; NON TENDER ABDOMEN; NO EDEMA     CT HEAD NEG  TROPONIN(S) NEG    IMPRESSION:    SINUS BRADYCARDIA / HEMODYNAMICALLY STABLE  I DOUBT BRADYCARDIA RELATED TO FALLS; NONETHELESS, WILL STOP BETA BLOCKER IF BRADYCARDIA A CONTRIBUTING FACTOR  CHANGE TO NORVASC 2.5 QD    FAMILY PRESENT AT BEDSIDE     ROSANNA TATE MD, FACC;     10/27/19    ORTHOSTATICS NOTED  TELEMETRY: SINUS BRADYCARDIA     STABLE CARDIOVASCULAR STATUS; CONTINUING WITH PRESENT MANAGEMENT.   OUT PATIENT FOLLOW UP RE ASYMPTOMATIC BRADYCARDIA  ROSANNA TATE MD, FACC

## 2019-10-27 NOTE — DISCHARGE NOTE NURSING/CASE MANAGEMENT/SOCIAL WORK - PATIENT PORTAL LINK FT
You can access the FollowMyHealth Patient Portal offered by [facility] by registering at the following website: [email or web address]. By joining [employer]’s FollowMyHealth portal, you will also be able to view your health information using other applications (apps) compatible with our system.

## 2019-10-27 NOTE — DISCHARGE NOTE PROVIDER - NSDCFUSCHEDAPPT_GEN_ALL_CORE_FT
EYAL REDD ; 12/30/2019 ; NPP Neuro [facility]  EYAL REDD ; 01/16/2020 ; NPP Med Int [address]

## 2019-10-27 NOTE — DISCHARGE NOTE PROVIDER - NSDCCPCAREPLAN_GEN_ALL_CORE_FT
PRINCIPAL DISCHARGE DIAGNOSIS  Diagnosis: Fall  Assessment and Plan of Treatment: HPI:  77 y/o female with htn, hld lewy body dementia? presents with witnessed fall and hit her head. Denies LOC or seizure. Pt was standing and suddenly fell. Unknown if she passed out and she doesnt remember. Same thing happened last week. Pt has been having worsening memory and was recently diagnosed with lewy body dementia?   ED: VSS. head CT shows no acute abnormalities (25 Oct 2019 19:50)   Denies LOC or seizure. This is the second time in last week. Pt cannot remember if she synopsized or the incident d.t her dementia   Fall/syncope   - sinus jazz on tele   - will get home physical therapy  - ++ orthostatics. likely the cause of fall vs bradycayrdia.  will  the family and pt on getting up slowly and can follow up with cardiologist Dr. Miranda   - MRI negative for acute findings   HLD   - statin   htn   -hold BB d,t bradycardia as is could be contributing to the orthostatics  - start amlodpine   -   < from: MR Head No Cont (10.27.19 @ 10:40) >  FINDINGS:  HEMISPHERES:  No diffusion restriction or MR evidence of acute ischemia   is noted. Chronic ischemic changes are noted the in the white matter of   both hemispheres, most prevalent in the peritrigonal region bilaterally.   A chronic cyst is noted in the left basal ganglia. No microhemorrhage or   mass is noted.  VENTRICLES:  midline and normal in size  POSTERIOR FOSSA:  There is a extrinsic deformity of the left medulla by   tortuous vertebral artery. No ischemic changes are noted  EXTRA-AXIAL:  No mass or collections are depicted.  CRANIAL NERVES:  No abnormality noted.  VASCULATURE:  The major vessels and venous sinuses are grossly patent.    SINUSES AND MASTOIDS:  Clear.  MISCELLANEOUS:  No orbital or pituitary abnormality noted.  No skull base   lesion suggested.  IMPRESSION:      1)  chronic ischemic changes in the white matter of both hemispheres with   volume loss. No diffusion restriction or MR evidence of acute ischemia..  2)  clear sinuses and mastoids..   < end of copied text >        SECONDARY DISCHARGE DIAGNOSES  Diagnosis: Unable to ambulate  Assessment and Plan of Treatment:

## 2019-10-30 DIAGNOSIS — R00.1 BRADYCARDIA, UNSPECIFIED: ICD-10-CM

## 2019-10-30 DIAGNOSIS — I10 ESSENTIAL (PRIMARY) HYPERTENSION: ICD-10-CM

## 2019-10-30 DIAGNOSIS — D64.9 ANEMIA, UNSPECIFIED: ICD-10-CM

## 2019-10-30 DIAGNOSIS — Z88.0 ALLERGY STATUS TO PENICILLIN: ICD-10-CM

## 2019-10-30 DIAGNOSIS — G93.0 CEREBRAL CYSTS: ICD-10-CM

## 2019-10-30 DIAGNOSIS — G31.83 NEUROCOGNITIVE DISORDER WITH LEWY BODIES: ICD-10-CM

## 2019-10-30 DIAGNOSIS — Z79.899 OTHER LONG TERM (CURRENT) DRUG THERAPY: ICD-10-CM

## 2019-10-30 DIAGNOSIS — I67.82 CEREBRAL ISCHEMIA: ICD-10-CM

## 2019-10-30 DIAGNOSIS — R55 SYNCOPE AND COLLAPSE: ICD-10-CM

## 2019-10-30 DIAGNOSIS — E78.5 HYPERLIPIDEMIA, UNSPECIFIED: ICD-10-CM

## 2019-10-30 DIAGNOSIS — F02.80 DEMENTIA IN OTHER DISEASES CLASSIFIED ELSEWHERE, UNSPECIFIED SEVERITY, WITHOUT BEHAVIORAL DISTURBANCE, PSYCHOTIC DISTURBANCE, MOOD DISTURBANCE, AND ANXIETY: ICD-10-CM

## 2019-11-07 ENCOUNTER — APPOINTMENT (OUTPATIENT)
Dept: INTERNAL MEDICINE | Facility: CLINIC | Age: 79
End: 2019-11-07
Payer: MEDICARE

## 2019-11-07 VITALS — WEIGHT: 164 LBS | HEIGHT: 67 IN | BODY MASS INDEX: 25.74 KG/M2

## 2019-11-07 VITALS — RESPIRATION RATE: 12 BRPM | SYSTOLIC BLOOD PRESSURE: 130 MMHG | DIASTOLIC BLOOD PRESSURE: 90 MMHG | HEART RATE: 70 BPM

## 2019-11-07 PROCEDURE — 99214 OFFICE O/P EST MOD 30 MIN: CPT

## 2019-11-07 PROCEDURE — 99495 TRANSJ CARE MGMT MOD F2F 14D: CPT

## 2019-11-07 NOTE — HISTORY OF PRESENT ILLNESS
[Other: _____] : [unfilled] [FreeTextEntry1] : F/up for DM HCVD chol low vit D   \par Eval of dementia with vasc changes on MRI.  \par Consults from neuro and neuropsych noted.  \par Fell suddenly.  Hosp 10-25- to  10-27-19.  No traumatic injury.  Brain imaging and labs neg.  Conclusion was probably dementia related vs orthostatic vs bradycardia.  Bradycardia noted on tele and Metoprolol was DC and Amlod 2.5 started\par C/o R scalp "cramping up" last night.  OK now.

## 2019-11-07 NOTE — ASSESSMENT
[FreeTextEntry1] : Fell 10-25 at sisters house.  Hosp till 10-27.  No acute trauma.  Brain imaging CT and MRI neg except for ischemic changes.  Monitor showed bradycardia.  Orthostasis and bradycardia considered as causes vs dementia.  Metoprolol was DC  Amlod 2.5 started.  Doing ok now. To get HHA and in home PT. \par Dementia, depression, anxiety. Hallucination in the past.   Saw Dr Harley.  MRI shows vasc change. Neuro psych report excellent .  Needs HHA/.  Dtr considering moving her in with her.  Has cameras that dtr watches.  TIA 1980's had Coumadin then ASA related to carotid artery. Cont  ASA.\par Cerumen R ear again cleared.     Has seen Dr Ronquillo in past\par Anemia .  Had transfusion 2013 with neg EGD and colonoscopy  Hb normal.   \par HCVD. OK\par Hyperchol. Cont Zocor  \par DM HBA1C    5.7\par Cataracts.  Told 1 yr f/up\par Constip.  Seems OK\par Gait instability.  OA Hip  Surg rec  PT  \par Low Vit D.  Better\par  \par

## 2019-11-07 NOTE — REVIEW OF SYSTEMS
[Confusion] : confusion [Memory Loss] : memory loss [Depression] : depression [Anxiety] : anxiety [Negative] : Heme/Lymph [Chest Pain] : no chest pain [Palpitations] : no palpitations [Lower Ext Edema] : no lower extremity edema [Melena] : no melena [Muscle Pain] : no muscle pain [Back Pain] : no back pain [Headache] : no headache [Fainting] : no fainting [Dizziness] : no dizziness [Suicidal] : not suicidal [FreeTextEntry2] : see HPI [FreeTextEntry5] : see HPI [de-identified] : See HPI

## 2019-11-07 NOTE — PHYSICAL EXAM
[No Acute Distress] : no acute distress [Well Nourished] : well nourished [Well Developed] : well developed [Well-Appearing] : well-appearing [Normal Sclera/Conjunctiva] : normal sclera/conjunctiva [PERRL] : pupils equal round and reactive to light [EOMI] : extraocular movements intact [Normal Outer Ear/Nose] : the outer ears and nose were normal in appearance [Normal Oropharynx] : the oropharynx was normal [Normal TMs] : both tympanic membranes were normal [No JVD] : no jugular venous distention [No Lymphadenopathy] : no lymphadenopathy [Supple] : supple [Thyroid Normal, No Nodules] : the thyroid was normal and there were no nodules present [No Respiratory Distress] : no respiratory distress  [No Accessory Muscle Use] : no accessory muscle use [Normal Rate] : normal rate  [Clear to Auscultation] : lungs were clear to auscultation bilaterally [Regular Rhythm] : with a regular rhythm [Normal S1, S2] : normal S1 and S2 [No Murmur] : no murmur heard [No Carotid Bruits] : no carotid bruits [No Abdominal Bruit] : a ~M bruit was not heard ~T in the abdomen [No Varicosities] : no varicosities [Pedal Pulses Present] : the pedal pulses are present [No Edema] : there was no peripheral edema [No Palpable Aorta] : no palpable aorta [No Extremity Clubbing/Cyanosis] : no extremity clubbing/cyanosis [Soft] : abdomen soft [Non-distended] : non-distended [Non Tender] : non-tender [No Masses] : no abdominal mass palpated [No HSM] : no HSM [Normal Bowel Sounds] : normal bowel sounds [Normal Supraclavicular Nodes] : no supraclavicular lymphadenopathy [Normal Posterior Cervical Nodes] : no posterior cervical lymphadenopathy [No CVA Tenderness] : no CVA  tenderness [Normal Anterior Cervical Nodes] : no anterior cervical lymphadenopathy [No Joint Swelling] : no joint swelling [No Spinal Tenderness] : no spinal tenderness [Grossly Normal Strength/Tone] : grossly normal strength/tone [No Rash] : no rash [Coordination Grossly Intact] : coordination grossly intact [No Focal Deficits] : no focal deficits [Normal Gait] : normal gait [Deep Tendon Reflexes (DTR)] : deep tendon reflexes were 2+ and symmetric [Normal Insight/Judgement] : insight and judgment were intact [Normal Affect] : the affect was normal [Alert and Oriented x3] : oriented to person, place, and time [Comprehensive Foot Exam Normal] : Right and left foot were examined and both feet are normal. No ulcers in either foot. Toes are normal and with full ROM.  Normal tactile sensation with monofilament testing throughout both feet [de-identified] : Cerumen mild bilat

## 2019-12-27 ENCOUNTER — RX RENEWAL (OUTPATIENT)
Age: 79
End: 2019-12-27

## 2019-12-30 ENCOUNTER — APPOINTMENT (OUTPATIENT)
Dept: NEUROLOGY | Facility: CLINIC | Age: 79
End: 2019-12-30
Payer: MEDICARE

## 2019-12-30 VITALS
BODY MASS INDEX: 25.43 KG/M2 | HEIGHT: 67 IN | DIASTOLIC BLOOD PRESSURE: 76 MMHG | HEART RATE: 73 BPM | WEIGHT: 162 LBS | SYSTOLIC BLOOD PRESSURE: 121 MMHG

## 2019-12-30 PROCEDURE — 99214 OFFICE O/P EST MOD 30 MIN: CPT

## 2019-12-30 NOTE — DISCUSSION/SUMMARY
[FreeTextEntry1] : 79 W who has lewy body dementia and vascular dementia who is on namenda. She feels timid when she walks. WIll prescribe quad cane for her to use. She was asked to stay hydrated and to monitor lightheaded symptoms.\par fu in 6 months\par More than 50% of time spent on counseling and educate patient on differential, workup, disease course, and treatment/management. Education was provided to the patient during this encounter. All questions and concerns were answered and addressed in detail. The patient verbalized understanding and agreed to plan. Patient was advised to continue to monitor for neurologic symptoms and to notify my office or go to the nearest emergency room if there are any changes. \par Side effects of the above medications were discussed in detail including but not limited to applicable black box warning and teratogenicity as appropriate. \par Patient was advised to bring previous records to my office. \par \par

## 2019-12-30 NOTE — PHYSICAL EXAM
[General Appearance - Alert] : alert [Oriented To Time, Place, And Person] : oriented to person, place, and time [Person] : oriented to person [Time] : oriented to time [Place] : oriented to place [Cranial Nerves Optic (II)] : visual acuity intact bilaterally,  visual fields full to confrontation, pupils equal round and reactive to light [Cranial Nerves Oculomotor (III)] : extraocular motion intact [Cranial Nerves Facial (VII)] : face symmetrical [Cranial Nerves Trigeminal (V)] : facial sensation intact symmetrically [Cranial Nerves Vestibulocochlear (VIII)] : hearing was intact bilaterally [Cranial Nerves Accessory (XI - Cranial And Spinal)] : head turning and shoulder shrug symmetric [Cranial Nerves Glossopharyngeal (IX)] : tongue and palate midline [Motor Tone] : muscle tone was normal in all four extremities [Sensation Tactile Decrease] : light touch was intact [Motor Strength] : muscle strength was normal in all four extremities [Cranial Nerves Hypoglossal (XII)] : there was no tongue deviation with protrusion [Abnormal Walk] : normal gait [Coordination - Dysmetria Impaired Finger-to-Nose Bilateral] : not present [1+] : Patella right 1+ [FreeTextEntry8] : normal base and swings both arms  [Extraocular Movements] : extraocular movements were intact [Hearing Threshold Finger Rub Not [place]] : hearing was normal [Full Pulse] : the pedal pulses are present [] : no rash [FreeTextEntry1] : see above

## 2019-12-30 NOTE — HISTORY OF PRESENT ILLNESS
[FreeTextEntry1] : 79 W who is here for initial consultation of memory loss that started about a year ago. She also has history of gait instability that was mentioned on a hct in 2014 that was unremarkable. When asked what she's forgetting, she pauses and mentions that the history is hard to remember. She lives by herself and has left the stove on. She eats 2 meals a day whereas she used to eat 3 meals. She eats chicken only for dinner. We started the conversation about an aide or assisted living but patient is very hesitant. She has recently showed depressed mood since the death of her mother 7 years ago and the moving out of her granddaughter 3 years ago. She feels lonely and isolated. She also experiences hallucinations especially at night. \par \par interval history: she is here for followup. Her neuropsych report was reviewed. She states her hallucinations have improved. She was started on aricept by Dr. Alicea. 3 weeks ago, she missed the seat and fell on the ground. Her tailbone and lower back is still painful at times. her labs from last visit was reviewed as well. She has cramps in her head and daughter via aunt wanted pet scan? \par \par interval history: she states she's doing well. She is tolerating namenda. She is off aricept. Her xray of spine and coccyx was neg for fracture. No further falls. She will continue physical therapy. Her daughter is trying to get services for her. She inquired about functional mri and was advised that at this time, it's not indicated.\par \par interval history: she had fall due to bradycardia. Her bp medications were changed to amlodipine and metoprolol was stopped. She has not had any syncopes since then. She goes to sleep at 1am and wakes up at 10am. She cannot afford the copayments at physical therapy.

## 2020-01-16 ENCOUNTER — NON-APPOINTMENT (OUTPATIENT)
Age: 80
End: 2020-01-16

## 2020-01-16 ENCOUNTER — APPOINTMENT (OUTPATIENT)
Dept: INTERNAL MEDICINE | Facility: CLINIC | Age: 80
End: 2020-01-16
Payer: MEDICARE

## 2020-01-16 VITALS — HEIGHT: 67 IN | BODY MASS INDEX: 26.21 KG/M2 | WEIGHT: 167 LBS

## 2020-01-16 VITALS — DIASTOLIC BLOOD PRESSURE: 64 MMHG | HEART RATE: 66 BPM | SYSTOLIC BLOOD PRESSURE: 108 MMHG | RESPIRATION RATE: 12 BRPM

## 2020-01-16 PROCEDURE — 99214 OFFICE O/P EST MOD 30 MIN: CPT | Mod: 25

## 2020-01-16 PROCEDURE — 93000 ELECTROCARDIOGRAM COMPLETE: CPT

## 2020-01-16 NOTE — PHYSICAL EXAM
[No Acute Distress] : no acute distress [Well Nourished] : well nourished [Well Developed] : well developed [Well-Appearing] : well-appearing [Normal Sclera/Conjunctiva] : normal sclera/conjunctiva [EOMI] : extraocular movements intact [PERRL] : pupils equal round and reactive to light [Normal Outer Ear/Nose] : the outer ears and nose were normal in appearance [Normal Oropharynx] : the oropharynx was normal [Normal TMs] : both tympanic membranes were normal [No Lymphadenopathy] : no lymphadenopathy [No JVD] : no jugular venous distention [No Respiratory Distress] : no respiratory distress  [Thyroid Normal, No Nodules] : the thyroid was normal and there were no nodules present [Supple] : supple [Clear to Auscultation] : lungs were clear to auscultation bilaterally [No Accessory Muscle Use] : no accessory muscle use [Normal Rate] : normal rate  [Normal S1, S2] : normal S1 and S2 [Regular Rhythm] : with a regular rhythm [No Murmur] : no murmur heard [No Carotid Bruits] : no carotid bruits [No Varicosities] : no varicosities [No Abdominal Bruit] : a ~M bruit was not heard ~T in the abdomen [No Palpable Aorta] : no palpable aorta [No Edema] : there was no peripheral edema [Pedal Pulses Present] : the pedal pulses are present [No Extremity Clubbing/Cyanosis] : no extremity clubbing/cyanosis [Soft] : abdomen soft [Non Tender] : non-tender [Non-distended] : non-distended [No Masses] : no abdominal mass palpated [No HSM] : no HSM [Normal Bowel Sounds] : normal bowel sounds [Normal Supraclavicular Nodes] : no supraclavicular lymphadenopathy [Normal Posterior Cervical Nodes] : no posterior cervical lymphadenopathy [Normal Anterior Cervical Nodes] : no anterior cervical lymphadenopathy [No CVA Tenderness] : no CVA  tenderness [No Spinal Tenderness] : no spinal tenderness [Grossly Normal Strength/Tone] : grossly normal strength/tone [No Joint Swelling] : no joint swelling [Coordination Grossly Intact] : coordination grossly intact [No Rash] : no rash [Deep Tendon Reflexes (DTR)] : deep tendon reflexes were 2+ and symmetric [Normal Gait] : normal gait [No Focal Deficits] : no focal deficits [Alert and Oriented x3] : oriented to person, place, and time [Normal Insight/Judgement] : insight and judgment were intact [Normal Affect] : the affect was normal [Comprehensive Foot Exam Normal] : Right and left foot were examined and both feet are normal. No ulcers in either foot. Toes are normal and with full ROM.  Normal tactile sensation with monofilament testing throughout both feet [de-identified] : Cerumen impaction bilateral

## 2020-01-16 NOTE — ASSESSMENT
[High Risk Surgery - Intraperitoneal, Intrathoracic or Supringuinal Vascular Procedures] : High Risk Surgery - Intraperitoneal, Intrathoracic or Supringuinal Vascular Procedures - No (0) [Ischemic Heart Disease] : Ischemic Heart Disease - No (0) [Congestive Heart Failure] : Congestive Heart Failure - No (0) [Prior Cerebrovascular Accident or TIA] : Prior Cerebrovascular Accident or TIA - No (0) [0] : 0 , RCRI Class: I, Risk of Post-Op Cardiac Complications: 0.4%, Procedure Risk: Low-Risk [Insulin-dependent Diabetic (1 Point)] : Insulin-dependent Diabetic - No (0) [Patient Optimized for Surgery] : Patient optimized for surgery [Creatinine >= 2mg/dL (1 Point)] : Creatinine >= 2mg/dL - No (0) [Continue anti-platelet treatment as is] : Continue current anti-platelet treatment [FreeTextEntry6] : Patient and daughter present instructed to hold ASA 1 week prior to each cataract surgery.   [FreeTextEntry4] : Optimal condition for bilateral cataract and IOL surgeries 1 mo apart.  \par Lewy body dementia.  Stable.  \par Pre DM.  HBA1C 5.8.\par Bradycardia on Metoprolol and it was DC.  \par Hyperchol controlled.  \par Falling episodes.  Non recently.  Attributable to  dementia.\par No prior surgery except for colonoscopy.  No anesthetic complication at colonoscopy.  \par No active cardiac or cerebral ischemia suspected.  \par Cerumen removed bilaterally and now canals are clear.  \par \par

## 2020-01-16 NOTE — REVIEW OF SYSTEMS
[Confusion] : confusion [Memory Loss] : memory loss [Depression] : depression [Anxiety] : anxiety [Negative] : Heme/Lymph [Chest Pain] : no chest pain [Melena] : no melena [Palpitations] : no palpitations [Lower Ext Edema] : no lower extremity edema [Back Pain] : no back pain [Muscle Pain] : no muscle pain [Headache] : no headache [Fainting] : no fainting [Dizziness] : no dizziness [Suicidal] : not suicidal [FreeTextEntry2] : see HPI [de-identified] : See HPI [FreeTextEntry5] : see HPI

## 2020-01-16 NOTE — HISTORY OF PRESENT ILLNESS
[No Pertinent Cardiac History] : no history of aortic stenosis, atrial fibrillation, coronary artery disease, recent myocardial infarction, or implantable device/pacemaker [No Pertinent Pulmonary History] : no history of asthma, COPD, sleep apnea, or smoking [No Adverse Anesthesia Reaction] : no adverse anesthesia reaction in self or family member [Diabetes] : diabetes [Anti-Platelet Agents: _____] : Anti-Platelet Agents: [unfilled] [Other: _____] : [unfilled] [Family Member] : no family member with adverse anesthesia reaction/sudden death [Chronic Anticoagulation] : no chronic anticoagulation [Self] : no previous adverse anesthesia reaction [Chronic Kidney Disease] : no chronic kidney disease [FreeTextEntry2] : LEFT 1/27/20 and Right 2/24/20 [FreeTextEntry1] : Left and Right cataract surg [FreeTextEntry3] : Dr William Ramírez

## 2020-01-21 ENCOUNTER — RX RENEWAL (OUTPATIENT)
Age: 80
End: 2020-01-21

## 2020-02-18 ENCOUNTER — APPOINTMENT (OUTPATIENT)
Dept: INTERNAL MEDICINE | Facility: CLINIC | Age: 80
End: 2020-02-18
Payer: MEDICARE

## 2020-02-18 ENCOUNTER — APPOINTMENT (OUTPATIENT)
Dept: INTERNAL MEDICINE | Facility: CLINIC | Age: 80
End: 2020-02-18

## 2020-02-18 VITALS — HEART RATE: 68 BPM | SYSTOLIC BLOOD PRESSURE: 100 MMHG | DIASTOLIC BLOOD PRESSURE: 60 MMHG | RESPIRATION RATE: 12 BRPM

## 2020-02-18 VITALS — WEIGHT: 162 LBS | HEIGHT: 67 IN | BODY MASS INDEX: 25.43 KG/M2

## 2020-02-18 PROCEDURE — 99214 OFFICE O/P EST MOD 30 MIN: CPT

## 2020-02-19 NOTE — HISTORY OF PRESENT ILLNESS
[Other: _____] : [unfilled] [No Pertinent Pulmonary History] : no history of asthma, COPD, sleep apnea, or smoking [No Pertinent Cardiac History] : no history of aortic stenosis, atrial fibrillation, coronary artery disease, recent myocardial infarction, or implantable device/pacemaker [No Adverse Anesthesia Reaction] : no adverse anesthesia reaction in self or family member [Chronic Anticoagulation] : no chronic anticoagulation [Chronic Kidney Disease] : no chronic kidney disease [Diabetes] : diabetes [(Patient denies any chest pain, claudication, dyspnea on exertion, orthopnea, palpitations or syncope)] : Patient denies any chest pain, claudication, dyspnea on exertion, orthopnea, palpitations or syncope [Anti-Platelet Agents: _____] : Anti-Platelet Agents: [unfilled] [FreeTextEntry2] : 2/24/20 [FreeTextEntry1] : Right cataract [FreeTextEntry3] : Dr Ramírez

## 2020-02-19 NOTE — RESULTS/DATA
[ECG Reviewed] : reviewed [Normal] : The 12 - lead ECG is normal [NSR] : normal sinus rhythm [P Waves Normal] : the P wave is normal [Normal QRS] : the QRS is normal [Normal ST Segments] : the ST segments are normal [No Interval Change] : no interval change [] : results reviewed

## 2020-02-19 NOTE — ASSESSMENT
[High Risk Surgery - Intraperitoneal, Intrathoracic or Supringuinal Vascular Procedures] : High Risk Surgery - Intraperitoneal, Intrathoracic or Supringuinal Vascular Procedures - No (0) [Ischemic Heart Disease] : Ischemic Heart Disease - No (0) [Prior Cerebrovascular Accident or TIA] : Prior Cerebrovascular Accident or TIA - No (0) [Congestive Heart Failure] : Congestive Heart Failure - No (0) [Insulin-dependent Diabetic (1 Point)] : Insulin-dependent Diabetic - No (0) [Creatinine >= 2mg/dL (1 Point)] : Creatinine >= 2mg/dL - No (0) [Continue anti-platelet treatment as is] : Continue current anti-platelet treatment [Patient Optimized for Surgery] : Patient optimized for surgery [FreeTextEntry4] : Optimal condition for right cataract and IOL surgery.  S/p L IOL 1-2020\par Lewy body dementia.  Stable.  \par Pre DM.  HBA1C 5.8.\par Bradycardia on Metoprolol and it was DC.  \par Hyperchol controlled.  \par Falling episodes.  None recently.  Attributable to  dementia.\par No prior surgery except for colonoscopy and cataract.  No anesthetic complication at colonoscopy and cataract.  \par No active cardiac or cerebral ischemia suspected.  \par Cerumen R ear.  L is clear.\par  [FreeTextEntry6] : Patient and daughter present instructed to hold ASA 1 week prior to each cataract surgery.

## 2020-02-19 NOTE — PHYSICAL EXAM
[No Acute Distress] : no acute distress [Well Nourished] : well nourished [Well Developed] : well developed [Well-Appearing] : well-appearing [PERRL] : pupils equal round and reactive to light [EOMI] : extraocular movements intact [Normal Sclera/Conjunctiva] : normal sclera/conjunctiva [Normal Outer Ear/Nose] : the outer ears and nose were normal in appearance [Normal Oropharynx] : the oropharynx was normal [Normal TMs] : both tympanic membranes were normal [No JVD] : no jugular venous distention [No Lymphadenopathy] : no lymphadenopathy [Supple] : supple [Thyroid Normal, No Nodules] : the thyroid was normal and there were no nodules present [No Respiratory Distress] : no respiratory distress  [Clear to Auscultation] : lungs were clear to auscultation bilaterally [No Accessory Muscle Use] : no accessory muscle use [Normal Rate] : normal rate  [Regular Rhythm] : with a regular rhythm [Normal S1, S2] : normal S1 and S2 [No Murmur] : no murmur heard [No Abdominal Bruit] : a ~M bruit was not heard ~T in the abdomen [No Carotid Bruits] : no carotid bruits [No Varicosities] : no varicosities [Pedal Pulses Present] : the pedal pulses are present [No Edema] : there was no peripheral edema [No Palpable Aorta] : no palpable aorta [No Extremity Clubbing/Cyanosis] : no extremity clubbing/cyanosis [Soft] : abdomen soft [Non Tender] : non-tender [Non-distended] : non-distended [No Masses] : no abdominal mass palpated [No HSM] : no HSM [Normal Bowel Sounds] : normal bowel sounds [Normal Posterior Cervical Nodes] : no posterior cervical lymphadenopathy [Normal Supraclavicular Nodes] : no supraclavicular lymphadenopathy [Normal Anterior Cervical Nodes] : no anterior cervical lymphadenopathy [No CVA Tenderness] : no CVA  tenderness [No Joint Swelling] : no joint swelling [No Spinal Tenderness] : no spinal tenderness [Grossly Normal Strength/Tone] : grossly normal strength/tone [No Rash] : no rash [Coordination Grossly Intact] : coordination grossly intact [No Focal Deficits] : no focal deficits [Normal Gait] : normal gait [Normal Affect] : the affect was normal [Deep Tendon Reflexes (DTR)] : deep tendon reflexes were 2+ and symmetric [Alert and Oriented x3] : oriented to person, place, and time [Normal Insight/Judgement] : insight and judgment were intact [Comprehensive Foot Exam Normal] : Right and left foot were examined and both feet are normal. No ulcers in either foot. Toes are normal and with full ROM.  Normal tactile sensation with monofilament testing throughout both feet [de-identified] : LEFT IOL noted [de-identified] : Cerumen mod R ear  L is clear

## 2020-02-19 NOTE — REVIEW OF SYSTEMS
[Confusion] : confusion [Memory Loss] : memory loss [Anxiety] : anxiety [Depression] : depression [Negative] : Heme/Lymph [Chest Pain] : no chest pain [Palpitations] : no palpitations [Lower Ext Edema] : no lower extremity edema [Muscle Pain] : no muscle pain [Melena] : no melena [Back Pain] : no back pain [Headache] : no headache [Dizziness] : no dizziness [Fainting] : no fainting [Suicidal] : not suicidal [FreeTextEntry2] : see HPI [de-identified] : See HPI [FreeTextEntry3] : See HPI

## 2020-06-22 ENCOUNTER — APPOINTMENT (OUTPATIENT)
Dept: INTERNAL MEDICINE | Facility: CLINIC | Age: 80
End: 2020-06-22
Payer: MEDICARE

## 2020-06-22 VITALS — WEIGHT: 161 LBS | HEIGHT: 67 IN | BODY MASS INDEX: 25.27 KG/M2

## 2020-06-22 VITALS — HEART RATE: 68 BPM | DIASTOLIC BLOOD PRESSURE: 56 MMHG | RESPIRATION RATE: 12 BRPM | SYSTOLIC BLOOD PRESSURE: 86 MMHG

## 2020-06-22 DIAGNOSIS — R79.89 OTHER SPECIFIED ABNORMAL FINDINGS OF BLOOD CHEMISTRY: ICD-10-CM

## 2020-06-22 PROCEDURE — 99214 OFFICE O/P EST MOD 30 MIN: CPT

## 2020-06-22 RX ORDER — PREDNISOLONE ACETATE 10 MG/ML
1 SUSPENSION/ DROPS OPHTHALMIC
Qty: 5 | Refills: 0 | Status: DISCONTINUED | COMMUNITY
Start: 2020-02-18

## 2020-06-22 RX ORDER — DIFLUPREDNATE 0.5 MG/ML
0.05 EMULSION OPHTHALMIC
Qty: 5 | Refills: 0 | Status: COMPLETED | COMMUNITY
Start: 2020-02-13

## 2020-06-22 RX ORDER — ASCORBIC ACID 500 MG
TABLET ORAL DAILY
Qty: 30 | Refills: 0 | Status: ACTIVE | COMMUNITY
Start: 2020-06-22

## 2020-06-22 RX ORDER — OFLOXACIN 3 MG/ML
0.3 SOLUTION/ DROPS OPHTHALMIC
Qty: 5 | Refills: 0 | Status: COMPLETED | COMMUNITY
Start: 2020-02-13

## 2020-06-22 NOTE — HISTORY OF PRESENT ILLNESS
[Other: _____] : [unfilled] [FreeTextEntry1] : F/up for DM HCVD chol low vit D   \par Eval of dementia with vasc changes on MRI.  \par Walks without confidence and hesitates and she becomes unsteady\par Several minor falls since last visit.  No injury

## 2020-06-22 NOTE — PHYSICAL EXAM
[No Acute Distress] : no acute distress [Well Developed] : well developed [Well Nourished] : well nourished [Well-Appearing] : well-appearing [PERRL] : pupils equal round and reactive to light [Normal Sclera/Conjunctiva] : normal sclera/conjunctiva [EOMI] : extraocular movements intact [Normal Oropharynx] : the oropharynx was normal [Normal Outer Ear/Nose] : the outer ears and nose were normal in appearance [No Lymphadenopathy] : no lymphadenopathy [Normal TMs] : both tympanic membranes were normal [No JVD] : no jugular venous distention [Supple] : supple [No Respiratory Distress] : no respiratory distress  [Thyroid Normal, No Nodules] : the thyroid was normal and there were no nodules present [Normal Rate] : normal rate  [Clear to Auscultation] : lungs were clear to auscultation bilaterally [No Accessory Muscle Use] : no accessory muscle use [No Murmur] : no murmur heard [Normal S1, S2] : normal S1 and S2 [Regular Rhythm] : with a regular rhythm [No Varicosities] : no varicosities [No Carotid Bruits] : no carotid bruits [No Abdominal Bruit] : a ~M bruit was not heard ~T in the abdomen [No Palpable Aorta] : no palpable aorta [Pedal Pulses Present] : the pedal pulses are present [No Edema] : there was no peripheral edema [No Extremity Clubbing/Cyanosis] : no extremity clubbing/cyanosis [Non Tender] : non-tender [Soft] : abdomen soft [Non-distended] : non-distended [No Masses] : no abdominal mass palpated [No HSM] : no HSM [Normal Bowel Sounds] : normal bowel sounds [Normal Supraclavicular Nodes] : no supraclavicular lymphadenopathy [Normal Posterior Cervical Nodes] : no posterior cervical lymphadenopathy [Normal Anterior Cervical Nodes] : no anterior cervical lymphadenopathy [No CVA Tenderness] : no CVA  tenderness [No Spinal Tenderness] : no spinal tenderness [No Joint Swelling] : no joint swelling [Grossly Normal Strength/Tone] : grossly normal strength/tone [No Rash] : no rash [Coordination Grossly Intact] : coordination grossly intact [No Focal Deficits] : no focal deficits [Normal Gait] : normal gait [Normal Affect] : the affect was normal [Deep Tendon Reflexes (DTR)] : deep tendon reflexes were 2+ and symmetric [Alert and Oriented x3] : oriented to person, place, and time [Normal Insight/Judgement] : insight and judgment were intact [Comprehensive Foot Exam Normal] : Right and left foot were examined and both feet are normal. No ulcers in either foot. Toes are normal and with full ROM.  Normal tactile sensation with monofilament testing throughout both feet [de-identified] : LEFT IOL noted [de-identified] : Placido mod bilat

## 2020-06-22 NOTE — REVIEW OF SYSTEMS
[Vision Problems] : vision problems [Confusion] : confusion [Unsteady Walking] : ataxia [Memory Loss] : memory loss [Depression] : depression [Anxiety] : anxiety [Negative] : Integumentary [Chest Pain] : no chest pain [Palpitations] : no palpitations [Melena] : no melena [Muscle Pain] : no muscle pain [Lower Ext Edema] : no lower extremity edema [Dizziness] : no dizziness [Headache] : no headache [Back Pain] : no back pain [Suicidal] : not suicidal [FreeTextEntry2] : see HPI [Fainting] : no fainting [FreeTextEntry3] : See HPI  Vision complaints persist but ophth MD finds everything ok and rec OTC reading glasses [de-identified] : See HPI

## 2020-06-22 NOTE — ASSESSMENT
[FreeTextEntry1] : Fell 10-25 at sisters house.  Hosp till 10-27.  No acute trauma.  Brain imaging CT and MRI neg except for ischemic changes.  Monitor showed bradycardia.  Orthostasis and bradycardia considered as causes vs dementia.  Metoprolol was DC  Amlod 2.5 started. Since BP low will DC AMlodipine. \par Unsteady gait. Several minor falls since last visit and dtr feels she lack confidence due to OMS and refused walker  Dtr will try to enforce walker\par Dementia, depression, anxiety. Hallucination in the past.   Saw Dr Harley.  MRI shows vasc change. Neuro psych report excellent .  Needs HHA/.  Dtr considering moving her in with her.  Has cameras that dtr watches.  TIA 1980's had Coumadin then ASA related to carotid artery. Cont  ASA.\par Cerumen mod bilat   Has seen Dr Ronquillo in past\par Anemia .  Had transfusion 2013 with neg EGD and colonoscopy  Hb normal.   \par HCVD. OK\par Hyperchol. Cont Zocor  \par DM HBA1C    5.7\par Cataracts removed has IOL  bilat\par Constip.  Seems OK\par Low Vit D.  Better\par  \par

## 2020-06-22 NOTE — HEALTH RISK ASSESSMENT
[Monthly or less (1 pt)] : Monthly or less (1 point) [1 or 2 (0 pts)] : 1 or 2 (0 points) [No] : In the past 12 months have you used drugs other than those required for medical reasons? No [Never (0 pts)] : Never (0 points) [de-identified] : Beer prn [] : No [Audit-CScore] :  1

## 2020-07-08 ENCOUNTER — APPOINTMENT (OUTPATIENT)
Dept: NEUROLOGY | Facility: CLINIC | Age: 80
End: 2020-07-08
Payer: MEDICARE

## 2020-07-08 PROCEDURE — 99214 OFFICE O/P EST MOD 30 MIN: CPT | Mod: 95

## 2020-07-08 NOTE — HISTORY OF PRESENT ILLNESS
[FreeTextEntry1] : verbal consent given on 07/08/2020 and 11:44  by EYAL REDD at [address]\[place] [address]\par \par She is not using her cane. She was informed that she needs to stay safe by using the walking aide. \par \par CONSENT FOR VIDEO MEDICAL VISIT1. I understand that my physician wishes me to engage in a telemedicine consultation.2. My physician has explained to me how the video conferencing technology will be used to affect such a consultation will not be the same as a direct patient/health care provider visit due to the fact that I will not be in the same room as my physician 3. I understand there are potential risks to this technology, including interruptions, unauthorized access and technical difficulties. I understand that my health care provider or I can discontinue the telemedicine consult/visit if it is felt that the videoconferencing connections are not adequate for the situation.4. I understand that my healthcare information may be shared with other individuals for scheduling and billing purposes. Others may also be present during the consultation other than my physician and consulting health care provider in order to operate the video equipment. The above mentioned people will all maintain confidentiality of the information obtained. I further understand that I will be informed of their presence in the consultation and thus will have the right to request the following: (1) omit specific details of my medical history/physical examination that are personally sensitive to me; (2) ask non-medical personnel to leave the telemedicine examination room: and or (3) terminate the consultation at any time.5. I have had the alternatives to a telemedicine consultation explained to me, and in choosing to participate in a telemedicine consultation. I understand that some parts of the exam involving physical tests may be conducted by individuals at my location at the direction of the consulting health care provider.6. In an emergent consultation, I understand that the responsibility of the telemedicine consulting specialist is to advise my local practitioner and that the specialist’s responsibility will conclude upon the termination of the video conference connection.7. I understand that billing will occur from both my physician and as a facility fee from the site from which I am presented.8. I have had a direct conversation with my physician, during which I had the opportunity to ask questions in regard to this procedure. My questions have been answered and the risks, benefits and any practical alternatives have been discussed with me in a language in which I understand\par

## 2020-07-08 NOTE — PHYSICAL EXAM
[Person] : oriented to person [Place] : oriented to place [Time] : oriented to time [Cranial Nerves Oculomotor (III)] : extraocular motion intact [Cranial Nerves Facial (VII)] : face symmetrical [Cranial Nerves Vestibulocochlear (VIII)] : hearing was intact bilaterally [Cranial Nerves Accessory (XI - Cranial And Spinal)] : head turning and shoulder shrug symmetric [Cranial Nerves Hypoglossal (XII)] : there was no tongue deviation with protrusion [Abnormal Walk] : normal gait [Coordination - Dysmetria Impaired Finger-to-Nose Bilateral] : not present [FreeTextEntry8] : normal base and swings both arms

## 2020-07-08 NOTE — DISCUSSION/SUMMARY
[FreeTextEntry1] : 79 W who is here for followup of Lewy Body Dementia with gait instability. Will resume PT for gait and balance training. \par \par physician location: home\par patient location: home\par \par I spent 25 minutes of total time, during which more than 50% of the time was spent on counseling. I explained the diagnosis, other possible diagnoses, workup, and management, as well as answered any questions.\par \par This is a telemedicine visit that was performed using real time 2-way audiovisual technology. Verbal consent to participate in video visit was obtained and patient was aware of their right to refuse to participate in services delivered via telemedicine and the alternative and potential limitations of participating in a telemedicine visit vs a face-to-face visit; I have also informed the patient of my current location in the [facility] and the names of all persons participating in the telemedicine service and their role in the encounter. The patient agrees to have this service via Telehealth.\par \par  This visit occurred during the Coronavirus (COVID-19) Public Health Emergency. I discussed with the patient the nature of our telemedicine visits, that: \par • I would evaluate the patient and recommend diagnostics and treatments based on my assessment \par • Our sessions are not being recorded and that personal health information is protected \par • Our team would provide follow up care in person if/when the patient needs it.\par

## 2020-09-08 ENCOUNTER — RX RENEWAL (OUTPATIENT)
Age: 80
End: 2020-09-08

## 2020-09-25 ENCOUNTER — APPOINTMENT (OUTPATIENT)
Dept: NEUROLOGY | Facility: CLINIC | Age: 80
End: 2020-09-25

## 2020-09-27 ENCOUNTER — RX RENEWAL (OUTPATIENT)
Age: 80
End: 2020-09-27

## 2020-10-07 ENCOUNTER — APPOINTMENT (OUTPATIENT)
Dept: NEUROLOGY | Facility: CLINIC | Age: 80
End: 2020-10-07
Payer: MEDICARE

## 2020-10-07 VITALS — SYSTOLIC BLOOD PRESSURE: 169 MMHG | DIASTOLIC BLOOD PRESSURE: 78 MMHG

## 2020-10-07 PROCEDURE — 99214 OFFICE O/P EST MOD 30 MIN: CPT

## 2020-10-07 NOTE — DISCUSSION/SUMMARY
[FreeTextEntry1] : 79 W who has hx of LBD. She is aware of the hallucinations. We will try olanzatpine 2.5 mg around 4pm to see if that will help her symptoms. Her bp is elevated. sHe was asked to f/u with pmd. She also will try occupational therapy. She will fu with me.\par \par More than 50% of time spent on counseling and educate patient on differential, workup, disease course, and treatment/management. Education was provided to the patient during this encounter. All questions and concerns were answered and addressed in detail. The patient verbalized understanding and agreed to plan. Patient was advised to continue to monitor for neurologic symptoms and to notify my office or go to the nearest emergency room if there are any changes. \par Side effects of the above medications were discussed in detail including but not limited to applicable black box warning and teratogenicity as appropriate. \par Patient was advised to bring previous records to my office. \par \par \par

## 2020-10-07 NOTE — HISTORY OF PRESENT ILLNESS
[FreeTextEntry1] : 79 W who is here for followup of her lewy Body's dementia. She has been going to PT and gait has increased in stability. She had some hallucination of books near her table. We discussed not giving any antipsychotics as pt is well aware of the hallucinations. Marilee was concerned b/c there are certain times of day when she's alone. She wanted to try olanzapine. AFter discussion, Pt was agreeable to trying it. Marilee also states that she sometimes puts her clothes on inside out. She wishes to try occupational therapy.

## 2020-10-07 NOTE — PHYSICAL EXAM
[General Appearance - Alert] : alert [Oriented To Time, Place, And Person] : oriented to person, place, and time [Person] : oriented to person [Place] : oriented to place [Time] : oriented to time [Cranial Nerves Optic (II)] : visual acuity intact bilaterally,  visual fields full to confrontation, pupils equal round and reactive to light [Cranial Nerves Oculomotor (III)] : extraocular motion intact [Cranial Nerves Trigeminal (V)] : facial sensation intact symmetrically [Cranial Nerves Facial (VII)] : face symmetrical [Cranial Nerves Vestibulocochlear (VIII)] : hearing was intact bilaterally [Cranial Nerves Glossopharyngeal (IX)] : tongue and palate midline [Cranial Nerves Accessory (XI - Cranial And Spinal)] : head turning and shoulder shrug symmetric [Cranial Nerves Hypoglossal (XII)] : there was no tongue deviation with protrusion [Motor Tone] : muscle tone was normal in all four extremities [Motor Strength] : muscle strength was normal in all four extremities [Sensation Tactile Decrease] : light touch was intact [1+] : Patella left 1+ [Extraocular Movements] : extraocular movements were intact [Abnormal Walk] : normal gait [] : no rash [Coordination - Dysmetria Impaired Finger-to-Nose Bilateral] : not present [FreeTextEntry8] : normal base and swings both arms

## 2020-10-27 ENCOUNTER — APPOINTMENT (OUTPATIENT)
Dept: INTERNAL MEDICINE | Facility: CLINIC | Age: 80
End: 2020-10-27
Payer: MEDICARE

## 2020-10-27 VITALS — DIASTOLIC BLOOD PRESSURE: 70 MMHG | RESPIRATION RATE: 12 BRPM | SYSTOLIC BLOOD PRESSURE: 120 MMHG | HEART RATE: 75 BPM

## 2020-10-27 VITALS — WEIGHT: 166 LBS | HEIGHT: 67 IN | BODY MASS INDEX: 26.06 KG/M2

## 2020-10-27 PROCEDURE — 36415 COLL VENOUS BLD VENIPUNCTURE: CPT

## 2020-10-27 PROCEDURE — 90662 IIV NO PRSV INCREASED AG IM: CPT

## 2020-10-27 PROCEDURE — 99072 ADDL SUPL MATRL&STAF TM PHE: CPT

## 2020-10-27 PROCEDURE — 99214 OFFICE O/P EST MOD 30 MIN: CPT | Mod: 25

## 2020-10-27 PROCEDURE — G0008: CPT

## 2020-10-27 NOTE — PHYSICAL EXAM
[No Acute Distress] : no acute distress [Well Nourished] : well nourished [Well Developed] : well developed [Well-Appearing] : well-appearing [Normal Sclera/Conjunctiva] : normal sclera/conjunctiva [PERRL] : pupils equal round and reactive to light [EOMI] : extraocular movements intact [Normal Outer Ear/Nose] : the outer ears and nose were normal in appearance [Normal Oropharynx] : the oropharynx was normal [Normal TMs] : both tympanic membranes were normal [No JVD] : no jugular venous distention [No Lymphadenopathy] : no lymphadenopathy [Supple] : supple [Thyroid Normal, No Nodules] : the thyroid was normal and there were no nodules present [No Respiratory Distress] : no respiratory distress  [No Accessory Muscle Use] : no accessory muscle use [Clear to Auscultation] : lungs were clear to auscultation bilaterally [Normal Rate] : normal rate  [Regular Rhythm] : with a regular rhythm [Normal S1, S2] : normal S1 and S2 [No Murmur] : no murmur heard [No Carotid Bruits] : no carotid bruits [No Abdominal Bruit] : a ~M bruit was not heard ~T in the abdomen [No Varicosities] : no varicosities [Pedal Pulses Present] : the pedal pulses are present [No Edema] : there was no peripheral edema [No Palpable Aorta] : no palpable aorta [No Extremity Clubbing/Cyanosis] : no extremity clubbing/cyanosis [Soft] : abdomen soft [Non Tender] : non-tender [Non-distended] : non-distended [No Masses] : no abdominal mass palpated [No HSM] : no HSM [Normal Bowel Sounds] : normal bowel sounds [Normal Supraclavicular Nodes] : no supraclavicular lymphadenopathy [Normal Posterior Cervical Nodes] : no posterior cervical lymphadenopathy [Normal Anterior Cervical Nodes] : no anterior cervical lymphadenopathy [No CVA Tenderness] : no CVA  tenderness [No Spinal Tenderness] : no spinal tenderness [No Joint Swelling] : no joint swelling [Grossly Normal Strength/Tone] : grossly normal strength/tone [No Rash] : no rash [Coordination Grossly Intact] : coordination grossly intact [No Focal Deficits] : no focal deficits [Normal Gait] : normal gait [Deep Tendon Reflexes (DTR)] : deep tendon reflexes were 2+ and symmetric [Normal Affect] : the affect was normal [Alert and Oriented x3] : oriented to person, place, and time [Normal Insight/Judgement] : insight and judgment were intact [Comprehensive Foot Exam Normal] : Right and left foot were examined and both feet are normal. No ulcers in either foot. Toes are normal and with full ROM.  Normal tactile sensation with monofilament testing throughout both feet [de-identified] : LEFT IOL noted [de-identified] : [place] mod bilat

## 2020-10-27 NOTE — HISTORY OF PRESENT ILLNESS
[Other: _____] : [unfilled] [FreeTextEntry1] : F/up for DM HCVD chol low vit D   \par Eval of dementia with vasc changes on MRI.  \par Walks without confidence and hesitates and she becomes unsteady and has had falls.  No injury\par Saw Dr Harley.  On Zyprexa 2.5.  To have OT since forgetting how to self feed.\par Needs mammo and BMD\par Got Medicaid so will get HHA\par Restarted on AMlod 2.5 for elevated BP

## 2020-10-27 NOTE — REVIEW OF SYSTEMS
[Vision Problems] : vision problems [Confusion] : confusion [Memory Loss] : memory loss [Unsteady Walking] : ataxia [Anxiety] : anxiety [Depression] : depression [Negative] : Heme/Lymph [Chest Pain] : no chest pain [Palpitations] : no palpitations [Lower Ext Edema] : no lower extremity edema [Melena] : no melena [Muscle Pain] : no muscle pain [Back Pain] : no back pain [Headache] : no headache [Dizziness] : no dizziness [Fainting] : no fainting [Suicidal] : not suicidal [FreeTextEntry2] : see HPI [FreeTextEntry3] : See HPI  Vision complaints persist but ophth MD finds everything ok and rec OTC reading glasses [de-identified] : See HPI

## 2020-10-27 NOTE — ASSESSMENT
[FreeTextEntry1] : Fell 10-25-19 at [facility].  Hosp till 10-27.  No acute trauma.  Brain imaging CT and MRI neg except for ischemic changes.  Monitor showed bradycardia.  Orthostasis and bradycardia considered as causes vs dementia.  Metoprolol was DC  Amlod 2.5 started. Since BP low will DC AMlodipine. \par Unsteady gait. Several minor falls since last visit and dtr feels she lack confidence due to OMS and refuses walker  and noncompliant with quad cane. \par Dementia, depression, anxiety. Hallucination in the past.   Saw Dr Harley.  MRI shows vasc change. Neuro psych report excellent .  Has HHA/.  WIll get medicaid HHA soon.  Dtr considering moving her in with her.  Has cameras that dtr watches.  TIA 1980's had Coumadin then ASA related to carotid artery. Cont  ASA.\par Cerumen mod bilat   Has seen Dr Ronquillo in past\par Anemia .  Had transfusion 2013 with neg EGD and colonoscopy  Hb normal.   \par HCVD. OK back on AMlodipine 2.5 \par Hyperchol. Cont Zocor  \par DM HBA1C    5.7\par Cataracts removed has IOL  bilat\par Constip.  Seems OK on Miralax\par Low Vit D.  Better\par [place].  Saw Dr Keane.  To get hearing aide.\par  \par

## 2020-10-29 LAB
25(OH)D3 SERPL-MCNC: 73.4 NG/ML
ALBUMIN SERPL ELPH-MCNC: 4.4 G/DL
ALP BLD-CCNC: 81 U/L
ALT SERPL-CCNC: 16 U/L
ANION GAP SERPL CALC-SCNC: 8 MMOL/L
AST SERPL-CCNC: 16 U/L
BASOPHILS # BLD AUTO: 0.02 K/UL
BASOPHILS NFR BLD AUTO: 0.3 %
BILIRUB SERPL-MCNC: 0.2 MG/DL
BUN SERPL-MCNC: 25 MG/DL
CALCIUM SERPL-MCNC: 9.7 MG/DL
CHLORIDE SERPL-SCNC: 107 MMOL/L
CHOLEST SERPL-MCNC: 175 MG/DL
CO2 SERPL-SCNC: 26 MMOL/L
CREAT SERPL-MCNC: 1.19 MG/DL
EOSINOPHIL # BLD AUTO: 0.04 K/UL
EOSINOPHIL NFR BLD AUTO: 0.5 %
ESTIMATED AVERAGE GLUCOSE: 108 MG/DL
GLUCOSE SERPL-MCNC: 124 MG/DL
HBA1C MFR BLD HPLC: 5.4 %
HCT VFR BLD CALC: 39.8 %
HDLC SERPL-MCNC: 71 MG/DL
HGB BLD-MCNC: 12.2 G/DL
IMM GRANULOCYTES NFR BLD AUTO: 0.1 %
LDLC SERPL CALC-MCNC: 92 MG/DL
LYMPHOCYTES # BLD AUTO: 2.21 K/UL
LYMPHOCYTES NFR BLD AUTO: 29.3 %
MAN DIFF?: NORMAL
MCHC RBC-ENTMCNC: 30.7 GM/DL
MCHC RBC-ENTMCNC: 31.3 PG
MCV RBC AUTO: 102.1 FL
MONOCYTES # BLD AUTO: 0.64 K/UL
MONOCYTES NFR BLD AUTO: 8.5 %
NEUTROPHILS # BLD AUTO: 4.62 K/UL
NEUTROPHILS NFR BLD AUTO: 61.3 %
NONHDLC SERPL-MCNC: 104 MG/DL
PLATELET # BLD AUTO: 248 K/UL
POTASSIUM SERPL-SCNC: 5 MMOL/L
PROT SERPL-MCNC: 7 G/DL
RBC # BLD: 3.9 M/UL
RBC # FLD: 13.2 %
SODIUM SERPL-SCNC: 141 MMOL/L
TRIGL SERPL-MCNC: 60 MG/DL
TSH SERPL-ACNC: 1.3 UIU/ML
WBC # FLD AUTO: 7.54 K/UL

## 2020-11-11 ENCOUNTER — APPOINTMENT (OUTPATIENT)
Dept: NEUROLOGY | Facility: CLINIC | Age: 80
End: 2020-11-11
Payer: MEDICARE

## 2020-11-11 VITALS — SYSTOLIC BLOOD PRESSURE: 125 MMHG | DIASTOLIC BLOOD PRESSURE: 73 MMHG | HEART RATE: 76 BPM

## 2020-11-11 VITALS
WEIGHT: 166 LBS | HEART RATE: 74 BPM | BODY MASS INDEX: 26.06 KG/M2 | SYSTOLIC BLOOD PRESSURE: 143 MMHG | DIASTOLIC BLOOD PRESSURE: 81 MMHG | HEIGHT: 67 IN

## 2020-11-11 VITALS — TEMPERATURE: 97.2 F

## 2020-11-11 PROCEDURE — 99214 OFFICE O/P EST MOD 30 MIN: CPT

## 2020-11-11 PROCEDURE — 99072 ADDL SUPL MATRL&STAF TM PHE: CPT

## 2020-11-11 RX ORDER — OLANZAPINE 2.5 MG/1
2.5 TABLET, FILM COATED ORAL DAILY
Qty: 30 | Refills: 0 | Status: DISCONTINUED | COMMUNITY
Start: 2020-10-07 | End: 2020-11-11

## 2020-11-11 NOTE — PHYSICAL EXAM
[General Appearance - Alert] : alert [Oriented To Time, Place, And Person] : oriented to person, place, and time [Person] : oriented to person [Place] : oriented to place [Time] : oriented to time [Cranial Nerves Optic (II)] : visual acuity intact bilaterally,  visual fields full to confrontation, pupils equal round and reactive to light [Cranial Nerves Oculomotor (III)] : extraocular motion intact [Cranial Nerves Trigeminal (V)] : facial sensation intact symmetrically [Cranial Nerves Facial (VII)] : face symmetrical [Cranial Nerves Vestibulocochlear (VIII)] : hearing was intact bilaterally [Cranial Nerves Glossopharyngeal (IX)] : tongue and palate midline [Cranial Nerves Accessory (XI - Cranial And Spinal)] : head turning and shoulder shrug symmetric [Cranial Nerves Hypoglossal (XII)] : there was no tongue deviation with protrusion [Motor Tone] : muscle tone was normal in all four extremities [Motor Strength] : muscle strength was normal in all four extremities [Sensation Tactile Decrease] : light touch was intact [Coordination - Dysmetria Impaired Finger-to-Nose Bilateral] : not present [1+] : Patella left 1+ [FreeTextEntry8] : normal base and swings both arms  [Extraocular Movements] : extraocular movements were intact [Abnormal Walk] : normal gait [] : no rash

## 2020-11-11 NOTE — HISTORY OF PRESENT ILLNESS
[FreeTextEntry1] : 80 W who is here for followup of her lewy Body's dementia. She has been going to PT and gait has increased in stability. She had some hallucination of books near her table. We discussed not giving any antipsychotics as pt is well aware of the hallucinations. Marilee was concerned b/c there are certain times of day when she's alone. She wanted to try olanzapine. AFter discussion, Pt was agreeable to trying it. Marilee also states that she sometimes puts her clothes on inside out. She wishes to try occupational therapy.\par \par Interval history: The olanzapine has not done anything to help her sleeping issues.  It appears she takes naps and she does not sleep at night however she sleeps in until the early afternoon.  Marilee had recommended writing specifics on the Occupational Therapy.

## 2020-11-11 NOTE — DISCUSSION/SUMMARY
[FreeTextEntry1] : 80-year-old woman who is here for follow-up of her Lewy body dementia.  Patient will try occupational therapy for activities of daily living.  Patient will stop the olanzapine since it has not helped with her sleep issues.  Patient will avoid naps and try to wake up 1 hour before to push back to sleeping at night and being awake during the day.  As per daughter request will have her see a psychologist as she seems to be sad at times.  Patient does not appear to be suicidal or homicidal .she will follow-up in 3 months\par \par More than 50% of time spent on counseling and educate patient on differential, workup, disease course, and treatment/management. Education was provided to the patient during this encounter. All questions and concerns were answered and addressed in detail. The patient verbalized understanding and agreed to plan. Patient was advised to continue to monitor for neurologic symptoms and to notify my office or go to the nearest emergency room if there are any changes. \par Side effects of the above medications were discussed in detail including but not limited to applicable black box warning and teratogenicity as appropriate. \par Patient was advised to bring previous records to my office. \par \par \par

## 2020-12-07 ENCOUNTER — RX RENEWAL (OUTPATIENT)
Age: 80
End: 2020-12-07

## 2020-12-11 ENCOUNTER — NON-APPOINTMENT (OUTPATIENT)
Age: 80
End: 2020-12-11

## 2020-12-30 ENCOUNTER — APPOINTMENT (OUTPATIENT)
Dept: INTERNAL MEDICINE | Facility: CLINIC | Age: 80
End: 2020-12-30
Payer: MEDICARE

## 2020-12-30 VITALS — DIASTOLIC BLOOD PRESSURE: 60 MMHG | RESPIRATION RATE: 12 BRPM | SYSTOLIC BLOOD PRESSURE: 110 MMHG | HEART RATE: 74 BPM

## 2020-12-30 VITALS — WEIGHT: 163 LBS | BODY MASS INDEX: 25.53 KG/M2

## 2020-12-30 DIAGNOSIS — Z78.9 OTHER SPECIFIED HEALTH STATUS: ICD-10-CM

## 2020-12-30 DIAGNOSIS — Z23 ENCOUNTER FOR IMMUNIZATION: ICD-10-CM

## 2020-12-30 PROCEDURE — G0009: CPT

## 2020-12-30 PROCEDURE — 90732 PPSV23 VACC 2 YRS+ SUBQ/IM: CPT

## 2020-12-30 PROCEDURE — 90715 TDAP VACCINE 7 YRS/> IM: CPT

## 2020-12-30 PROCEDURE — 90472 IMMUNIZATION ADMIN EACH ADD: CPT

## 2020-12-30 PROCEDURE — 99072 ADDL SUPL MATRL&STAF TM PHE: CPT

## 2020-12-30 PROCEDURE — 99214 OFFICE O/P EST MOD 30 MIN: CPT | Mod: 25

## 2020-12-30 NOTE — REVIEW OF SYSTEMS
[Vision Problems] : vision problems [Confusion] : confusion [Memory Loss] : memory loss [Unsteady Walking] : ataxia [Anxiety] : anxiety [Depression] : depression [Negative] : Heme/Lymph [Chest Pain] : no chest pain [Palpitations] : no palpitations [Lower Ext Edema] : no lower extremity edema [Melena] : no melena [Muscle Pain] : no muscle pain [Back Pain] : no back pain [Headache] : no headache [Dizziness] : no dizziness [Fainting] : no fainting [Suicidal] : not suicidal [FreeTextEntry2] : see HPI [FreeTextEntry3] : See HPI  Vision complaints persist but ophth MD finds everything ok and rec OTC reading glasses [FreeTextEntry9] : Unsteady gait [de-identified] : See HPI

## 2020-12-30 NOTE — PHYSICAL EXAM
[No Acute Distress] : no acute distress [Well Nourished] : well nourished [Well Developed] : well developed [Well-Appearing] : well-appearing [Normal Sclera/Conjunctiva] : normal sclera/conjunctiva [PERRL] : pupils equal round and reactive to light [EOMI] : extraocular movements intact [Normal Outer Ear/Nose] : the outer ears and nose were normal in appearance [Normal Oropharynx] : the oropharynx was normal [Normal TMs] : both tympanic membranes were normal [No JVD] : no jugular venous distention [No Lymphadenopathy] : no lymphadenopathy [Supple] : supple [Thyroid Normal, No Nodules] : the thyroid was normal and there were no nodules present [No Respiratory Distress] : no respiratory distress  [No Accessory Muscle Use] : no accessory muscle use [Clear to Auscultation] : lungs were clear to auscultation bilaterally [Normal Rate] : normal rate  [Regular Rhythm] : with a regular rhythm [Normal S1, S2] : normal S1 and S2 [No Murmur] : no murmur heard [No Carotid Bruits] : no carotid bruits [No Abdominal Bruit] : a ~M bruit was not heard ~T in the abdomen [No Varicosities] : no varicosities [Pedal Pulses Present] : the pedal pulses are present [No Edema] : there was no peripheral edema [No Palpable Aorta] : no palpable aorta [No Extremity Clubbing/Cyanosis] : no extremity clubbing/cyanosis [Soft] : abdomen soft [Non Tender] : non-tender [Non-distended] : non-distended [No Masses] : no abdominal mass palpated [No HSM] : no HSM [Normal Bowel Sounds] : normal bowel sounds [Normal Supraclavicular Nodes] : no supraclavicular lymphadenopathy [Normal Posterior Cervical Nodes] : no posterior cervical lymphadenopathy [Normal Anterior Cervical Nodes] : no anterior cervical lymphadenopathy [No CVA Tenderness] : no CVA  tenderness [No Spinal Tenderness] : no spinal tenderness [No Joint Swelling] : no joint swelling [Grossly Normal Strength/Tone] : grossly normal strength/tone [No Rash] : no rash [Coordination Grossly Intact] : coordination grossly intact [No Focal Deficits] : no focal deficits [Normal Gait] : normal gait [Deep Tendon Reflexes (DTR)] : deep tendon reflexes were 2+ and symmetric [Normal Affect] : the affect was normal [Alert and Oriented x3] : oriented to person, place, and time [Normal Insight/Judgement] : insight and judgment were intact [Comprehensive Foot Exam Normal] : Right and left foot were examined and both feet are normal. No ulcers in either foot. Toes are normal and with full ROM.  Normal tactile sensation with monofilament testing throughout both feet [de-identified] : LEFT IOL noted [de-identified] : [place] mod bilat

## 2020-12-30 NOTE — HISTORY OF PRESENT ILLNESS
[Other: _____] : [unfilled] [FreeTextEntry1] : F/up for DM HCVD chol low vit D   \par Eval of dementia with vasc changes on MRI.  \par Walks without confidence and hesitates and she becomes unsteady and has had falls.  No injury\par Saw Dr Harley.  Had trial of Zyprexa 2.5 without benefit.  Getting OT since forgetting how to self feed.\par Got Medicaid so will get HHA.  Needs form to be completed. \par To have sigmoidoscopy 1/2021.  Using MiraLAX with benefit

## 2020-12-30 NOTE — ASSESSMENT
[FreeTextEntry1] : Fell 10-25-19 at [facility].  Hosp till 10-27.  No acute trauma.  Brain imaging CT and MRI neg except for ischemic changes.  Monitor showed bradycardia.  Orthostasis and bradycardia considered as causes vs dementia.  Metoprolol was DC  Amlod 2.5 started. Since BP low will DC AMlodipine. \par Unsteady gait. Several minor falls since last visit and dtr feels she lack confidence due to OMS and refuses walker  and noncompliant with quad cane. \par Dementia, depression, anxiety. Hallucination in the past.   Saw Dr Harley.  MRI shows vasc change. Neuro psych report excellent .  Has HHA/.  Trial of Zyprexa not helpful.  Has cameras that dtr watches.  TIA 1980's had Coumadin then ASA related to carotid artery. Cont  ASA.\par Cerumen mod bilat   Has seen Dr Ronquillo in past\par Anemia .  Had transfusion 2013 with neg EGD and colonoscopy  Hb normal.   \par HCVD. OK back on AMlodipine 2.5 \par Hyperchol. Cont Zocor  \par DM HBA1C    5.7\par Cataracts removed has IOL  bilat\par Constip.  Seems OK on Miralax\par Low Vit D.  Better\par [place].  Saw Dr Keane.  To get hearing aide.\par  \par

## 2021-02-16 ENCOUNTER — APPOINTMENT (OUTPATIENT)
Dept: MAMMOGRAPHY | Facility: CLINIC | Age: 81
End: 2021-02-16
Payer: MEDICARE

## 2021-02-16 ENCOUNTER — RESULT REVIEW (OUTPATIENT)
Age: 81
End: 2021-02-16

## 2021-02-16 ENCOUNTER — OUTPATIENT (OUTPATIENT)
Dept: OUTPATIENT SERVICES | Facility: HOSPITAL | Age: 81
LOS: 1 days | End: 2021-02-16
Payer: MEDICARE

## 2021-02-16 ENCOUNTER — APPOINTMENT (OUTPATIENT)
Dept: RADIOLOGY | Facility: CLINIC | Age: 81
End: 2021-02-16
Payer: MEDICARE

## 2021-02-16 DIAGNOSIS — Z00.00 ENCOUNTER FOR GENERAL ADULT MEDICAL EXAMINATION WITHOUT ABNORMAL FINDINGS: ICD-10-CM

## 2021-02-16 PROCEDURE — 77063 BREAST TOMOSYNTHESIS BI: CPT

## 2021-02-16 PROCEDURE — 77080 DXA BONE DENSITY AXIAL: CPT | Mod: 26

## 2021-02-16 PROCEDURE — 77080 DXA BONE DENSITY AXIAL: CPT

## 2021-02-16 PROCEDURE — 77067 SCR MAMMO BI INCL CAD: CPT | Mod: 26

## 2021-02-16 PROCEDURE — 77067 SCR MAMMO BI INCL CAD: CPT

## 2021-02-16 PROCEDURE — 77063 BREAST TOMOSYNTHESIS BI: CPT | Mod: 26

## 2021-03-24 ENCOUNTER — TRANSCRIPTION ENCOUNTER (OUTPATIENT)
Age: 81
End: 2021-03-24

## 2021-03-24 ENCOUNTER — APPOINTMENT (OUTPATIENT)
Dept: NEUROLOGY | Facility: CLINIC | Age: 81
End: 2021-03-24
Payer: MEDICARE

## 2021-03-24 VITALS — SYSTOLIC BLOOD PRESSURE: 125 MMHG | HEART RATE: 75 BPM | DIASTOLIC BLOOD PRESSURE: 79 MMHG

## 2021-03-24 PROCEDURE — 99214 OFFICE O/P EST MOD 30 MIN: CPT

## 2021-03-24 NOTE — PHYSICAL EXAM
[General Appearance - Alert] : alert [Oriented To Time, Place, And Person] : oriented to person, place, and time [Person] : oriented to person [Place] : oriented to place [Time] : oriented to time [Cranial Nerves Optic (II)] : visual acuity intact bilaterally,  visual fields full to confrontation, pupils equal round and reactive to light [Cranial Nerves Oculomotor (III)] : extraocular motion intact [Cranial Nerves Vestibulocochlear (VIII)] : hearing was intact bilaterally [Cranial Nerves Accessory (XI - Cranial And Spinal)] : head turning and shoulder shrug symmetric [Motor Tone] : muscle tone was normal in all four extremities [Motor Strength] : muscle strength was normal in all four extremities [Sensation Tactile Decrease] : light touch was intact [Coordination - Dysmetria Impaired Finger-to-Nose Bilateral] : not present [1+] : Patella left 1+ [FreeTextEntry8] : Guarded gait and walks with a cane

## 2021-03-24 NOTE — HISTORY OF PRESENT ILLNESS
[FreeTextEntry1] : 80 W who is here for followup of her lewy Body's dementia. She has been going to PT and gait has increased in stability. She had some hallucination of books near her table. We discussed not giving any antipsychotics as pt is well aware of the hallucinations. Marilee was concerned b/c there are certain times of day when she's alone. She wanted to try olanzapine. AFter discussion, Pt was agreeable to trying it. Marilee also states that she sometimes puts her clothes on inside out. She wishes to try occupational therapy.\par \par Interval history: The olanzapine has not done anything to help her sleeping issues.  It appears she takes naps and she does not sleep at night however she sleeps in until the early afternoon.  Marilee had recommended writing specifics on the Occupational Therapy.\par \par Interval history: Patient continues to have unsteadiness that is worse intermittently.  Patient states that when she does have her lightheaded spells her heart is beating very fast.  Patient's blood pressure is125/78 with a pulse of 75.  Patient seems to have a glazed over look whenever she has these episodes\par Patient has not started physical therapy or occupational therapy.\par

## 2021-03-24 NOTE — DISCUSSION/SUMMARY
[FreeTextEntry1] : 80-year-old woman with a history of Lewy body dementia is here for follow-up of lightheadedness that may be due to cardiac arrhythmia versus seizure disorder.  Patient will be referred to cardiology.  Her family was advised to check her blood pressure 3 times a day to evaluate for any fluctuations during the day.  We will also check EEG to evaluate for low suspicion of seizure activity\par \par Daughter requested neuropsychologic testing to confirm diagnosis of Lewy bodies dementia.  \par \par For her fall risk will send her to physical therapy for gait and balance training . daughter also requested occupational physical therapy as patient has difficulty with activities of daily living\par \par I spent the time noted on the day of this patient encounter preparing for, providing and documenting the above E/M service and counseling and educate patient on differential, workup, disease course, and treatment/management. Education was provided to the patient during this encounter. All questions and concerns were answered and addressed in detail. The patient verbalized understanding and agreed to plan. Patient was advised to continue to monitor for neurologic symptoms and to notify my office or go to the nearest emergency room if there are any changes. Any orders/referrals and communications were provided as well. \par Side effects of the above medications were discussed in detail including but not limited to applicable black box warning and teratogenicity as appropriate. \par Patient was advised to bring previous records to my office. \par \par \par

## 2021-04-01 ENCOUNTER — APPOINTMENT (OUTPATIENT)
Dept: NEUROLOGY | Facility: CLINIC | Age: 81
End: 2021-04-01
Payer: MEDICARE

## 2021-04-01 ENCOUNTER — NON-APPOINTMENT (OUTPATIENT)
Age: 81
End: 2021-04-01

## 2021-04-01 VITALS — TEMPERATURE: 95.4 F

## 2021-04-01 PROCEDURE — 95816 EEG AWAKE AND DROWSY: CPT

## 2021-04-09 ENCOUNTER — NON-APPOINTMENT (OUTPATIENT)
Age: 81
End: 2021-04-09

## 2021-04-21 ENCOUNTER — APPOINTMENT (OUTPATIENT)
Dept: NEUROLOGY | Facility: CLINIC | Age: 81
End: 2021-04-21

## 2021-04-26 ENCOUNTER — APPOINTMENT (OUTPATIENT)
Dept: CARDIOLOGY | Facility: CLINIC | Age: 81
End: 2021-04-26
Payer: MEDICARE

## 2021-04-26 ENCOUNTER — NON-APPOINTMENT (OUTPATIENT)
Age: 81
End: 2021-04-26

## 2021-04-26 VITALS
TEMPERATURE: 97.8 F | DIASTOLIC BLOOD PRESSURE: 80 MMHG | WEIGHT: 158 LBS | SYSTOLIC BLOOD PRESSURE: 130 MMHG | BODY MASS INDEX: 24.8 KG/M2 | HEIGHT: 67 IN | OXYGEN SATURATION: 94 % | HEART RATE: 74 BPM

## 2021-04-26 DIAGNOSIS — R09.89 OTHER SPECIFIED SYMPTOMS AND SIGNS INVOLVING THE CIRCULATORY AND RESPIRATORY SYSTEMS: ICD-10-CM

## 2021-04-26 DIAGNOSIS — R42 DIZZINESS AND GIDDINESS: ICD-10-CM

## 2021-04-26 DIAGNOSIS — R00.2 PALPITATIONS: ICD-10-CM

## 2021-04-26 PROCEDURE — 99204 OFFICE O/P NEW MOD 45 MIN: CPT

## 2021-04-26 PROCEDURE — 93270 REMOTE 30 DAY ECG REV/REPORT: CPT

## 2021-04-26 PROCEDURE — 93000 ELECTROCARDIOGRAM COMPLETE: CPT

## 2021-04-26 NOTE — DISCUSSION/SUMMARY
[___ Week(s)] : in [unfilled] week(s) [FreeTextEntry3] : Echo, bilateral carotid arterial study, Medic[employer] event 30-day heart monitor; 3-month office follow-up visit [FreeTextEntry1] : She is advised to continue her current aspirin 81 mg daily and amlodipine 2.5 mg daily for blood pressure management and cardiovascular risk reduction.  Stay on statin as well for lipid lowering and cerebrovascular and heart risk reduction as well.  Following home blood pressure readings as well suggested to watch for labile hypertension which could be contributory to symptoms.  I have ordered an echocardiogram to assess LV function and valvular status.  To better assess carotid disease burden and serve as a cardiovascular risk marker, especially with the possibility of a left carotid bruit on exam, I've ordered a carotid bilateral duplex study.  I've ordered a 30-day event monitor to assess for dysrhythmias including bradycardia arrhythmias or tachycardia arrhythmias.  I recommended a heart healthy lifestyle including a low-saturated fat, low cholesterol diet with improved aerobic physical fitness over time for cardiovascular benefits.  We will call the patient with test results and followup with you for general care.  See me in 3 months or sooner if needed.

## 2021-04-26 NOTE — PHYSICAL EXAM
[Well Developed] : well developed [Well Nourished] : well nourished [No Acute Distress] : no acute distress [Normal Conjunctiva] : normal conjunctiva [Normal Venous Pressure] : normal venous pressure [Carotid Bruit] : carotid bruit [Normal S1, S2] : normal S1, S2 [No Murmur] : no murmur [No Rub] : no rub [No Gallop] : no gallop [Clear Lung Fields] : clear lung fields [Good Air Entry] : good air entry [No Respiratory Distress] : no respiratory distress  [Soft] : abdomen soft [Non Tender] : non-tender [Normal Bowel Sounds] : normal bowel sounds [No Masses/organomegaly] : no masses/organomegaly [Normal Gait] : normal gait [No Edema] : no edema [No Cyanosis] : no cyanosis [No Clubbing] : no clubbing [No Varicosities] : no varicosities [No Rash] : no rash [No Skin Lesions] : no skin lesions [Moves all extremities] : moves all extremities [No Focal Deficits] : no focal deficits [Normal Speech] : normal speech [Alert and Oriented] : alert and oriented [Normal memory] : normal memory [de-identified] : Left-sided carotid bruit

## 2021-04-26 NOTE — REASON FOR VISIT
[Symptom and Test Evaluation] : symptom and test evaluation [Hypertension] : hypertension [Formal Caregiver] : formal caregiver [FreeTextEntry3] : Dr. Dangelo Alicea

## 2021-04-26 NOTE — HISTORY OF PRESENT ILLNESS
[FreeTextEntry1] : She is a pleasant 80-year-old female with dyslipidemia, hypertensive heart disease, history of TIA, cognitive decline comes over for cardiac assessment of lightheadedness and intermittent palpitations which are quite seldom.  She is here today with her home health care attendant.  Denies current chest pains, shortness of breath, syncope or edema.  She has had prior falls and unsteady gait and uses a walker at times.  Reportedly eating generally healthy.  I reviewed available lab data with her today as well.

## 2021-04-26 NOTE — REVIEW OF SYSTEMS
[Palpitations] : palpitations [Dizziness] : dizziness [Memory Lapses Or Loss] : memory lapses or loss [Negative] : Heme/Lymph

## 2021-04-28 ENCOUNTER — APPOINTMENT (OUTPATIENT)
Dept: NEUROLOGY | Facility: CLINIC | Age: 81
End: 2021-04-28

## 2021-05-21 ENCOUNTER — APPOINTMENT (OUTPATIENT)
Dept: CARDIOLOGY | Facility: CLINIC | Age: 81
End: 2021-05-21
Payer: MEDICARE

## 2021-05-21 PROCEDURE — 93880 EXTRACRANIAL BILAT STUDY: CPT

## 2021-05-21 PROCEDURE — 93306 TTE W/DOPPLER COMPLETE: CPT

## 2021-05-27 ENCOUNTER — APPOINTMENT (OUTPATIENT)
Dept: NEUROLOGY | Facility: CLINIC | Age: 81
End: 2021-05-27
Payer: MEDICARE

## 2021-05-27 PROCEDURE — 99214 OFFICE O/P EST MOD 30 MIN: CPT | Mod: 95

## 2021-05-27 NOTE — HISTORY OF PRESENT ILLNESS
[FreeTextEntry1] : verbal consent given on 05/27/2021 and 15:57  by EYAL REDD at [address] [facility]\[place] [address]\par \par \par 80 W who is here for followup of her lewy Body's dementia. She has been going to PT and gait has increased in stability. She had some hallucination of books near her table. We discussed not giving any antipsychotics as pt is well aware of the hallucinations. Marilee was concerned b/c there are certain times of day when she's alone. She wanted to try olanzapine. AFter discussion, Pt was agreeable to trying it. Marilee also states that she sometimes puts her clothes on inside out. She wishes to try occupational therapy.\par \par Interval history: The olanzapine has not done anything to help her sleeping issues.  It appears she takes naps and she does not sleep at night however she sleeps in until the early afternoon.  Marilee had recommended writing specifics on the Occupational Therapy.\par \par Interval history: Patient continues to have unsteadiness that is worse intermittently.  Patient states that when she does have her lightheaded spells her heart is beating very fast.  Patient's blood pressure is125/78 with a pulse of 75.  Patient seems to have a glazed over look whenever she has these episodes\par Patient has not started physical therapy or occupational therapy.\par \par Interval history: Patient is wearing the Holter monitor to evaluate for any signs of arrhythmia.  Patient had a EEG which was unremarkable.  Patient's blood pressure seems to be in the 120s over 70s when family checks it at home.  Patient has upcoming neuropsych evaluation in July.  Patient benefited from the occupational therapy and physical therapy and would like to continue the treatment.  \par \par CONSENT FOR VIDEO MEDICAL VISIT1. I understand that my physician wishes me to engage in a telemedicine consultation.2. My physician has explained to me how the video conferencing technology will be used to affect such a consultation will not be the same as a direct patient/health care provider visit due to the fact that I will not be in the same room as my physician 3. I understand there are potential risks to this technology, including interruptions, unauthorized access and technical difficulties. I understand that my health care provider or I can discontinue the telemedicine consult/visit if it is felt that the videoconferencing connections are not adequate for the situation.4. I understand that my healthcare information may be shared with other individuals for scheduling and billing purposes. Others may also be present during the consultation other than my physician and consulting health care provider in order to operate the video equipment. The above mentioned people will all maintain confidentiality of the information obtained. I further understand that I will be informed of their presence in the consultation and thus will have the right to request the following: (1) omit specific details of my medical history/physical examination that are personally sensitive to me; (2) ask non-medical personnel to leave the telemedicine examination room: and or (3) terminate the consultation at any time.5. I have had the alternatives to a telemedicine consultation explained to me, and in choosing to participate in a telemedicine consultation. I understand that some parts of the exam involving physical tests may be conducted by individuals at my location at the direction of the consulting health care provider.6. In an emergent consultation, I understand that the responsibility of the telemedicine consulting specialist is to advise my local practitioner and that the specialist’s responsibility will conclude upon the termination of the video conference connection.7. I understand that billing will occur from both my physician and as a facility fee from the site from which I am presented.8. I have had a direct conversation with my physician, during which I had the opportunity to ask questions in regard to this procedure. My questions have been answered and the risks, benefits and any practical alternatives have been discussed with me in a language in which I understand\par

## 2021-05-27 NOTE — PHYSICAL EXAM
[Person] : oriented to person [Place] : oriented to place [Time] : oriented to time [Cranial Nerves Oculomotor (III)] : extraocular motion intact [Cranial Nerves Facial (VII)] : face symmetrical [Cranial Nerves Vestibulocochlear (VIII)] : hearing was intact bilaterally [Cranial Nerves Accessory (XI - Cranial And Spinal)] : head turning and shoulder shrug symmetric [Paresis Pronator Drift Right-Sided] : no pronator drift on the right [Paresis Pronator Drift Left-Sided] : no pronator drift on the left [Coordination - Dysmetria Impaired Finger-to-Nose Bilateral] : not present [1+] : Patella left 1+ [FreeTextEntry8] : Guarded gait

## 2021-05-27 NOTE — DISCUSSION/SUMMARY
[FreeTextEntry1] : 80-year-old woman with a history of Lewy body dementia is here for follow-up of lightheadedness that may be due to cardiac arrhythmia. She is still undergoing evaluation with cardiology.  Her EEG was unremarkable. Patient will continue monitoring blood pressure at home.  Daughter requested neuropsych evaluation to confirm the diagnosis of Lewy body dementia.  She has upcoming appointment with neuropsychology.  We will continue physical therapy for gait and balance training and Occupational Therapy with activities of daily living such as buttoning her shirt etc.\par \par physician location: office\par patient location: home\par \par I spent 30 minutes of total time, during which more than 50% of the time was spent on counseling. I explained the diagnosis, other possible diagnoses, workup, and management, as well as answered any questions.\par \par This is a telemedicine visit that was performed using real time 2-way audiovisual technology. Verbal consent to participate in video visit was obtained and patient was aware of their right to refuse to participate in services delivered via telemedicine and the alternative and potential limitations of participating in a telemedicine visit vs a face-to-face visit; I have also informed the patient of my current location in the [facility] and the names of all persons participating in the telemedicine service and their role in the encounter. The patient agrees to have this service via Telehealth.\par \par  This visit occurred during the Coronavirus (COVID-19) Public Health Emergency. I discussed with the patient the nature of our telemedicine visits, that: \par • I would evaluate the patient and recommend diagnostics and treatments based on my assessment \par • Our sessions are not being recorded and that personal health information is protected \par • Our team would provide follow up care in person if/when the patient needs it.\par \par

## 2021-06-09 RX ORDER — AMLODIPINE BESYLATE 2.5 MG/1
2.5 TABLET ORAL DAILY
Qty: 90 | Refills: 3 | Status: DISCONTINUED | COMMUNITY
Start: 2019-11-07 | End: 2021-06-09

## 2021-07-21 ENCOUNTER — APPOINTMENT (OUTPATIENT)
Dept: NEUROLOGY | Facility: CLINIC | Age: 81
End: 2021-07-21
Payer: MEDICARE

## 2021-07-21 PROCEDURE — 96132 NRPSYC TST EVAL PHYS/QHP 1ST: CPT

## 2021-07-21 PROCEDURE — 96138 PSYCL/NRPSYC TECH 1ST: CPT | Mod: NC

## 2021-07-21 PROCEDURE — 96133 NRPSYC TST EVAL PHYS/QHP EA: CPT

## 2021-07-21 PROCEDURE — 96139 PSYCL/NRPSYC TST TECH EA: CPT | Mod: NC

## 2021-07-21 PROCEDURE — 96121 NUBHVL XM PHY/QHP EA ADDL HR: CPT

## 2021-07-21 PROCEDURE — 96116 NUBHVL XM PHYS/QHP 1ST HR: CPT

## 2021-07-26 ENCOUNTER — APPOINTMENT (OUTPATIENT)
Dept: CARDIOLOGY | Facility: CLINIC | Age: 81
End: 2021-07-26
Payer: MEDICARE

## 2021-07-26 ENCOUNTER — NON-APPOINTMENT (OUTPATIENT)
Age: 81
End: 2021-07-26

## 2021-07-26 VITALS
OXYGEN SATURATION: 98 % | HEIGHT: 67 IN | DIASTOLIC BLOOD PRESSURE: 70 MMHG | HEART RATE: 71 BPM | BODY MASS INDEX: 24.33 KG/M2 | TEMPERATURE: 98.7 F | WEIGHT: 155 LBS | SYSTOLIC BLOOD PRESSURE: 106 MMHG

## 2021-07-26 DIAGNOSIS — Z86.79 PERSONAL HISTORY OF OTHER DISEASES OF THE CIRCULATORY SYSTEM: ICD-10-CM

## 2021-07-26 PROCEDURE — 93000 ELECTROCARDIOGRAM COMPLETE: CPT

## 2021-07-26 PROCEDURE — 99214 OFFICE O/P EST MOD 30 MIN: CPT

## 2021-07-26 RX ORDER — ASPIRIN 81 MG/1
81 TABLET ORAL DAILY
Qty: 30 | Refills: 11 | Status: DISCONTINUED | COMMUNITY
Start: 2017-08-18 | End: 2021-07-26

## 2021-07-26 NOTE — PHYSICAL EXAM
[Well Developed] : well developed [Well Nourished] : well nourished [No Acute Distress] : no acute distress [Normal Conjunctiva] : normal conjunctiva [Normal Venous Pressure] : normal venous pressure [Normal S1, S2] : normal S1, S2 [No Murmur] : no murmur [No Rub] : no rub [No Gallop] : no gallop [Clear Lung Fields] : clear lung fields [Good Air Entry] : good air entry [No Respiratory Distress] : no respiratory distress  [Soft] : abdomen soft [Non Tender] : non-tender [No Masses/organomegaly] : no masses/organomegaly [Normal Bowel Sounds] : normal bowel sounds [Normal Gait] : normal gait [No Edema] : no edema [No Cyanosis] : no cyanosis [No Clubbing] : no clubbing [No Varicosities] : no varicosities [No Rash] : no rash [No Skin Lesions] : no skin lesions [Moves all extremities] : moves all extremities [No Focal Deficits] : no focal deficits [Normal Speech] : normal speech [Alert and Oriented] : alert and oriented [Normal memory] : normal memory [Right Carotid Bruit] : no bruit heard over the right carotid [Left Carotid Bruit] : no bruit heard over the left carotid [1+] : left 1+

## 2021-07-26 NOTE — DISCUSSION/SUMMARY
[___ Month(s)] : in [unfilled] month(s) [FreeTextEntry1] : She will continue on her current antihypertensive regimen of amlodipine for better blood pressure management.  Continue on simvastatin for lipid lowering along with aspirin daily for cardiovascular risk reduction.  No further cardiac testing recommended at present.  Age-appropriate and as tolerated physical fitness along with low-salt, low-fat diet encouraged.  Follow-up with you for care and see me in 6 months or sooner if needed.

## 2021-07-26 NOTE — HISTORY OF PRESENT ILLNESS
[FreeTextEntry1] : She is 80 with TIA history along with dyslipidemia, hypertensive heart disease who follows up in the office today with her caregiver without any obvious change in clinical status.  I understand amlodipine recently increased for better blood pressure control.  I reviewed recent cardiac data including echo, carotid and monitoring revealing PAT. Echo and carotid are within acceptable limits.

## 2021-08-12 ENCOUNTER — APPOINTMENT (OUTPATIENT)
Dept: NEUROLOGY | Facility: CLINIC | Age: 81
End: 2021-08-12
Payer: MEDICARE

## 2021-08-12 PROCEDURE — 96132 NRPSYC TST EVAL PHYS/QHP 1ST: CPT | Mod: 95

## 2021-10-28 ENCOUNTER — APPOINTMENT (OUTPATIENT)
Dept: INTERNAL MEDICINE | Facility: CLINIC | Age: 81
End: 2021-10-28
Payer: MEDICARE

## 2021-10-28 ENCOUNTER — RX RENEWAL (OUTPATIENT)
Age: 81
End: 2021-10-28

## 2021-10-28 VITALS — WEIGHT: 152 LBS | HEIGHT: 67 IN | BODY MASS INDEX: 23.86 KG/M2

## 2021-10-28 VITALS — HEART RATE: 74 BPM | DIASTOLIC BLOOD PRESSURE: 60 MMHG | RESPIRATION RATE: 12 BRPM | SYSTOLIC BLOOD PRESSURE: 102 MMHG

## 2021-10-28 DIAGNOSIS — R29.6 REPEATED FALLS: ICD-10-CM

## 2021-10-28 DIAGNOSIS — G31.84 MILD COGNITIVE IMPAIRMENT, SO STATED: ICD-10-CM

## 2021-10-28 DIAGNOSIS — Z78.9 OTHER SPECIFIED HEALTH STATUS: ICD-10-CM

## 2021-10-28 PROCEDURE — 36415 COLL VENOUS BLD VENIPUNCTURE: CPT

## 2021-10-28 PROCEDURE — 99214 OFFICE O/P EST MOD 30 MIN: CPT | Mod: 25

## 2021-10-28 NOTE — HEALTH RISK ASSESSMENT
[] : No [No] : In the past 12 months have you used drugs other than those required for medical reasons? No [No falls in past year] : Patient reported no falls in the past year [Medical reason not done] : Medical reason not done [Audit-CScore] : 0 [de-identified] : Dementia

## 2021-10-28 NOTE — PHYSICAL EXAM
[Well Nourished] : well nourished [Well Developed] : well developed [Well-Appearing] : well-appearing [Normal Sclera/Conjunctiva] : normal sclera/conjunctiva [PERRL] : pupils equal round and reactive to light [EOMI] : extraocular movements intact [Normal Outer Ear/Nose] : the outer ears and nose were normal in appearance [Normal TMs] : both tympanic membranes were normal [No JVD] : no jugular venous distention [No Lymphadenopathy] : no lymphadenopathy [Supple] : supple [Thyroid Normal, No Nodules] : the thyroid was normal and there were no nodules present [No Respiratory Distress] : no respiratory distress  [No Accessory Muscle Use] : no accessory muscle use [Clear to Auscultation] : lungs were clear to auscultation bilaterally [Normal Rate] : normal rate  [Regular Rhythm] : with a regular rhythm [Normal S1, S2] : normal S1 and S2 [No Murmur] : no murmur heard [No Carotid Bruits] : no carotid bruits [No Abdominal Bruit] : a ~M bruit was not heard ~T in the abdomen [No Varicosities] : no varicosities [Pedal Pulses Present] : the pedal pulses are present [No Edema] : there was no peripheral edema [No Palpable Aorta] : no palpable aorta [No Extremity Clubbing/Cyanosis] : no extremity clubbing/cyanosis [Soft] : abdomen soft [Non Tender] : non-tender [Non-distended] : non-distended [No Masses] : no abdominal mass palpated [No HSM] : no HSM [Normal Bowel Sounds] : normal bowel sounds [Normal Supraclavicular Nodes] : no supraclavicular lymphadenopathy [Normal Posterior Cervical Nodes] : no posterior cervical lymphadenopathy [Normal Anterior Cervical Nodes] : no anterior cervical lymphadenopathy [No CVA Tenderness] : no CVA  tenderness [No Spinal Tenderness] : no spinal tenderness [No Joint Swelling] : no joint swelling [Grossly Normal Strength/Tone] : grossly normal strength/tone [No Rash] : no rash [Coordination Grossly Intact] : coordination grossly intact [No Focal Deficits] : no focal deficits [Normal Gait] : normal gait [Deep Tendon Reflexes (DTR)] : deep tendon reflexes were 2+ and symmetric [Normal Affect] : the affect was normal [Alert and Oriented x3] : oriented to person, place, and time [Normal Insight/Judgement] : insight and judgment were intact [Comprehensive Foot Exam Normal] : Right and left foot were examined and both feet are normal. No ulcers in either foot. Toes are normal and with full ROM.  Normal tactile sensation with monofilament testing throughout both feet [de-identified] : IOL bilat

## 2021-10-28 NOTE — HISTORY OF PRESENT ILLNESS
[Other: _____] : [unfilled] [FreeTextEntry1] : F/up for DM HCVD chol low vit D   \par Eval of dementia with vasc changes on MRI.  \par Walks without confidence and hesitates and she becomes unsteady and has had falls.  No injury\par Saw Dr Harley.  Had trial of Zyprexa 2.5 without benefit.  Got OT since forgetting how to self feed. Dtr feels she should resume.\par Got Medicaid HHA.   \par Using MiraLAX with benefit

## 2021-10-28 NOTE — ASSESSMENT
[FreeTextEntry1] : Dementia, depression, anxiety. Hallucination in the past.   Saw Dr Harley.  MRI shows vasc change. Neuro psych report excellent .  Has HHA/.  Trial of Zyprexa not helpful.  Has cameras that dtr watches.  TIA 1980's had Coumadin then ASA related to carotid artery. Cont  ASA.\par Anemia .  Had transfusion 2013 with neg EGD and colonoscopy  Hb normal.   \par HCVD. OK back on AMlodipine 2.5 \par Hyperchol. Cont Zocor  \par DM HBA1C    5.8\par Cataracts removed has IOL  bilat\par Constip.  Seems OK on Miralax\par Low Vit D.  Better\par [place].  Saw Dr Keane.  To get hearing aide.\par Covid vax 2 x done\par Flu vax given L deltoid\par  \par

## 2021-10-28 NOTE — REVIEW OF SYSTEMS
[Vision Problems] : vision problems [Chest Pain] : no chest pain [Palpitations] : no palpitations [Lower Ext Edema] : no lower extremity edema [Melena] : no melena [Muscle Pain] : no muscle pain [Back Pain] : no back pain [Headache] : no headache [Dizziness] : no dizziness [Fainting] : no fainting [Confusion] : confusion [Memory Loss] : memory loss [Unsteady Walking] : ataxia [Suicidal] : not suicidal [Anxiety] : anxiety [Depression] : depression [Negative] : Heme/Lymph [FreeTextEntry2] : see HPI [FreeTextEntry3] : See HPI  Vision complaints persist but ophth MD finds everything ok and rec OTC reading glasses [FreeTextEntry9] : Unsteady gait [de-identified] : See HPI

## 2021-10-29 LAB
25(OH)D3 SERPL-MCNC: 54.1 NG/ML
ALBUMIN SERPL ELPH-MCNC: 4.3 G/DL
ALP BLD-CCNC: 89 U/L
ALT SERPL-CCNC: 18 U/L
ANION GAP SERPL CALC-SCNC: 13 MMOL/L
AST SERPL-CCNC: 21 U/L
BASOPHILS # BLD AUTO: 0.05 K/UL
BASOPHILS NFR BLD AUTO: 0.5 %
BILIRUB SERPL-MCNC: 0.2 MG/DL
BUN SERPL-MCNC: 16 MG/DL
CALCIUM SERPL-MCNC: 9.9 MG/DL
CHLORIDE SERPL-SCNC: 105 MMOL/L
CHOLEST SERPL-MCNC: 180 MG/DL
CO2 SERPL-SCNC: 23 MMOL/L
CREAT SERPL-MCNC: 1.02 MG/DL
EOSINOPHIL # BLD AUTO: 0.03 K/UL
EOSINOPHIL NFR BLD AUTO: 0.3 %
ESTIMATED AVERAGE GLUCOSE: 117 MG/DL
GLUCOSE SERPL-MCNC: 93 MG/DL
HBA1C MFR BLD HPLC: 5.7 %
HCT VFR BLD CALC: 42.3 %
HDLC SERPL-MCNC: 64 MG/DL
HGB BLD-MCNC: 13 G/DL
IMM GRANULOCYTES NFR BLD AUTO: 0.2 %
LDLC SERPL CALC-MCNC: 101 MG/DL
LYMPHOCYTES # BLD AUTO: 3.05 K/UL
LYMPHOCYTES NFR BLD AUTO: 33.2 %
MAN DIFF?: NORMAL
MCHC RBC-ENTMCNC: 30.7 GM/DL
MCHC RBC-ENTMCNC: 31.6 PG
MCV RBC AUTO: 102.9 FL
MONOCYTES # BLD AUTO: 0.77 K/UL
MONOCYTES NFR BLD AUTO: 8.4 %
NEUTROPHILS # BLD AUTO: 5.27 K/UL
NEUTROPHILS NFR BLD AUTO: 57.4 %
NONHDLC SERPL-MCNC: 116 MG/DL
PLATELET # BLD AUTO: 286 K/UL
POTASSIUM SERPL-SCNC: 4.8 MMOL/L
PROT SERPL-MCNC: 7.1 G/DL
RBC # BLD: 4.11 M/UL
RBC # FLD: 13.2 %
SODIUM SERPL-SCNC: 141 MMOL/L
TRIGL SERPL-MCNC: 75 MG/DL
TSH SERPL-ACNC: 1.56 UIU/ML
WBC # FLD AUTO: 9.19 K/UL

## 2021-12-02 ENCOUNTER — APPOINTMENT (OUTPATIENT)
Dept: NEUROLOGY | Facility: CLINIC | Age: 81
End: 2021-12-02
Payer: MEDICARE

## 2021-12-02 VITALS
SYSTOLIC BLOOD PRESSURE: 124 MMHG | WEIGHT: 152 LBS | HEIGHT: 67 IN | HEART RATE: 74 BPM | BODY MASS INDEX: 23.86 KG/M2 | DIASTOLIC BLOOD PRESSURE: 74 MMHG

## 2021-12-02 DIAGNOSIS — Z86.59 PERSONAL HISTORY OF OTHER MENTAL AND BEHAVIORAL DISORDERS: ICD-10-CM

## 2021-12-02 PROCEDURE — 99214 OFFICE O/P EST MOD 30 MIN: CPT

## 2021-12-02 NOTE — DISCUSSION/SUMMARY
[FreeTextEntry1] : 81-year-old woman with a history of Lewy bodies dementia who is here for follow-up of her frequent falls and now new symptom of being unresponsive for 2 to 3 minutes in the middle of her walking and therefore that leads to her falls.  Patient will obtain EEG for low suspicion of seizure activity.  Patient will also be sent for psychology consultation as she does have a history of depression and seems to have signs of anhedonia.\par \par I spent the time noted on the day of this patient encounter preparing for, providing and documenting the above E/M service and counseling and educate patient on differential, workup, disease course, and treatment/management. Education was provided to the patient during this encounter. All questions and concerns were answered and addressed in detail. The patient verbalized understanding and agreed to plan. Patient was advised to continue to monitor for neurologic symptoms and to notify my office or go to the nearest emergency room if there are any changes. Any orders/referrals and communications were provided as well. \par Side effects of the above medications were discussed in detail including but not limited to applicable black box warning and teratogenicity as appropriate. \par Patient was advised to bring previous records to my office. \par \par \par

## 2021-12-02 NOTE — PHYSICAL EXAM
[General Appearance - Alert] : alert [Oriented To Time, Place, And Person] : oriented to person, place, and time [Person] : oriented to person [Place] : disoriented to place [Time] : oriented to time [Cranial Nerves Oculomotor (III)] : extraocular motion intact [Cranial Nerves Facial (VII)] : face symmetrical [Cranial Nerves Vestibulocochlear (VIII)] : hearing was intact bilaterally [Cranial Nerves Accessory (XI - Cranial And Spinal)] : head turning and shoulder shrug symmetric [Motor Tone] : muscle tone was normal in all four extremities [Motor Strength] : muscle strength was normal in all four extremities [Paresis Pronator Drift Right-Sided] : no pronator drift on the right [Paresis Pronator Drift Left-Sided] : no pronator drift on the left [Sensation Tactile Decrease] : light touch was intact [Coordination - Dysmetria Impaired Finger-to-Nose Bilateral] : not present [1+] : Patella left 1+ [FreeTextEntry8] : walks with walker

## 2021-12-02 NOTE — HISTORY OF PRESENT ILLNESS
[FreeTextEntry1] : 81 W who is here for followup of her lewy Body's dementia. She has been going to PT and gait has increased in stability. She had some hallucination of books near her table. We discussed not giving any antipsychotics as pt is well aware of the hallucinations. Marilee was concerned b/c there are certain times of day when she's alone. She wanted to try olanzapine. AFter discussion, Pt was agreeable to trying it. Marilee also states that she sometimes puts her clothes on inside out. She wishes to try occupational therapy.\par \par Interval history: The olanzapine has not done anything to help her sleeping issues.  It appears she takes naps and she does not sleep at night however she sleeps in until the early afternoon.  Marilee had recommended writing specifics on the Occupational Therapy.\par \par Interval history: Patient continues to have unsteadiness that is worse intermittently.  Patient states that when she does have her lightheaded spells her heart is beating very fast.  Patient's blood pressure is125/78 with a pulse of 75.  Patient seems to have a glazed over look whenever she has these episodes\par Patient has not started physical therapy or occupational therapy.\par \par Interval history: Patient is wearing the Holter monitor to evaluate for any signs of arrhythmia.  Patient had a EEG which was unremarkable.  Patient's blood pressure seems to be in the 120s over 70s when family checks it at home.  Patient has upcoming neuropsych evaluation in July.  Patient benefited from the occupational therapy and physical therapy and would like to continue the treatment.  \par \par Interval history: Patient's daughter states that the occupational and physical therapist states that they no longer want to continue her sessions despite new prescription from me.  Patient is noted to have falls where she would stop while she is walking and then turn her head to the left.  Patient would not respond for 2 to 3 minutes during these episodes.  Patient's daughter is in the process of getting more help as she has only 4 hours of aide.  Patient tends to fall and needs help with activities of daily living.  Patient needs help with dressing and with task transitions.  Patient's daughter is her only caretaker and she works full-time and on the weekends she cares for her mother.  Patient's daughter is experiencing burnout.  Patient is also noted to have anxiety and worries where there is decreased social interactions even when she is at any adult day programs.  Patient was asked and she denied any crying or signs of depression.

## 2021-12-30 ENCOUNTER — NON-APPOINTMENT (OUTPATIENT)
Age: 81
End: 2021-12-30

## 2021-12-30 DIAGNOSIS — R13.10 DYSPHAGIA, UNSPECIFIED: ICD-10-CM

## 2022-01-10 ENCOUNTER — APPOINTMENT (OUTPATIENT)
Dept: INTERNAL MEDICINE | Facility: CLINIC | Age: 82
End: 2022-01-10

## 2022-01-10 ENCOUNTER — RX RENEWAL (OUTPATIENT)
Age: 82
End: 2022-01-10

## 2022-01-13 ENCOUNTER — APPOINTMENT (OUTPATIENT)
Dept: NEUROLOGY | Facility: CLINIC | Age: 82
End: 2022-01-13

## 2022-01-25 ENCOUNTER — APPOINTMENT (OUTPATIENT)
Dept: INTERNAL MEDICINE | Facility: CLINIC | Age: 82
End: 2022-01-25
Payer: MEDICARE

## 2022-01-25 VITALS
HEART RATE: 83 BPM | OXYGEN SATURATION: 98 % | BODY MASS INDEX: 22.6 KG/M2 | SYSTOLIC BLOOD PRESSURE: 100 MMHG | DIASTOLIC BLOOD PRESSURE: 70 MMHG | WEIGHT: 144 LBS | HEIGHT: 67 IN

## 2022-01-25 PROCEDURE — 99213 OFFICE O/P EST LOW 20 MIN: CPT

## 2022-01-25 RX ORDER — POLYETHYLENE GLYCOL 3350 AND ELECTROLYTES WITH LEMON FLAVOR 236; 22.74; 6.74; 5.86; 2.97 G/4L; G/4L; G/4L; G/4L; G/4L
236 POWDER, FOR SOLUTION ORAL
Qty: 4000 | Refills: 0 | Status: DISCONTINUED | COMMUNITY
Start: 2020-10-01 | End: 2022-01-25

## 2022-01-25 RX ORDER — NEOMYCIN SULFATE, POLYMYXIN B SULFATE AND DEXAMETHASONE 3.5; 10000; 1 MG/ML; [USP'U]/ML; MG/ML
3.5-10000-0.1 SUSPENSION OPHTHALMIC
Qty: 5 | Refills: 0 | Status: DISCONTINUED | COMMUNITY
Start: 2020-06-05 | End: 2022-01-25

## 2022-01-25 RX ORDER — CALCIUM CARBONATE 160(400)MG
TABLET,CHEWABLE ORAL
Qty: 1 | Refills: 0 | Status: ACTIVE | OUTPATIENT
Start: 2021-08-10

## 2022-01-25 NOTE — PHYSICAL EXAM
[No Acute Distress] : no acute distress [Well Nourished] : well nourished [Well Developed] : well developed [Well-Appearing] : well-appearing [No Respiratory Distress] : no respiratory distress  [No Accessory Muscle Use] : no accessory muscle use [Clear to Auscultation] : lungs were clear to auscultation bilaterally [Normal Rate] : normal rate  [Regular Rhythm] : with a regular rhythm [Normal S1, S2] : normal S1 and S2 [No Murmur] : no murmur heard [No Edema] : there was no peripheral edema [No Extremity Clubbing/Cyanosis] : no extremity clubbing/cyanosis [de-identified] : elderly [de-identified] : baseline

## 2022-01-25 NOTE — PLAN
[FreeTextEntry1] : Discussed paperwork with daughter and completed request for home help with her assistance. Pt advised to sign up for [facility] portal to review labs and communicate any questions or concerns directly. Yearly physical and return as needed for illness, medication refills, and new or existing complaints.

## 2022-01-25 NOTE — REVIEW OF SYSTEMS
[Headache] : no headache [Dizziness] : no dizziness [Fainting] : no fainting [Confusion] : no confusion [Memory Loss] : memory loss [Unsteady Walking] : ataxia [Negative] : Psychiatric

## 2022-02-23 ENCOUNTER — APPOINTMENT (OUTPATIENT)
Dept: NEUROLOGY | Facility: CLINIC | Age: 82
End: 2022-02-23
Payer: MEDICARE

## 2022-02-23 ENCOUNTER — NON-APPOINTMENT (OUTPATIENT)
Age: 82
End: 2022-02-23

## 2022-02-23 PROCEDURE — 95816 EEG AWAKE AND DROWSY: CPT

## 2022-02-24 PROCEDURE — 95708 EEG WO VID EA 12-26HR UNMNTR: CPT

## 2022-02-24 PROCEDURE — 95719 EEG PHYS/QHP EA INCR W/O VID: CPT

## 2022-02-24 PROCEDURE — 95700 EEG CONT REC W/VID EEG TECH: CPT

## 2022-02-24 PROCEDURE — 95717 EEG PHYS/QHP 2-12 HR W/O VID: CPT

## 2022-02-24 PROCEDURE — 95705 EEG W/O VID 2-12 HR UNMNTR: CPT

## 2022-02-28 ENCOUNTER — NON-APPOINTMENT (OUTPATIENT)
Age: 82
End: 2022-02-28

## 2022-03-01 ENCOUNTER — NON-APPOINTMENT (OUTPATIENT)
Age: 82
End: 2022-03-01

## 2022-03-07 DIAGNOSIS — R63.4 ABNORMAL WEIGHT LOSS: ICD-10-CM

## 2022-03-25 ENCOUNTER — APPOINTMENT (OUTPATIENT)
Dept: NEUROLOGY | Facility: CLINIC | Age: 82
End: 2022-03-25

## 2022-04-11 ENCOUNTER — NON-APPOINTMENT (OUTPATIENT)
Age: 82
End: 2022-04-11

## 2022-04-11 ENCOUNTER — APPOINTMENT (OUTPATIENT)
Dept: CARDIOLOGY | Facility: CLINIC | Age: 82
End: 2022-04-11
Payer: MEDICARE

## 2022-04-11 VITALS
HEIGHT: 67 IN | DIASTOLIC BLOOD PRESSURE: 70 MMHG | HEART RATE: 85 BPM | SYSTOLIC BLOOD PRESSURE: 120 MMHG | OXYGEN SATURATION: 96 % | WEIGHT: 142 LBS | TEMPERATURE: 97.7 F | BODY MASS INDEX: 22.29 KG/M2

## 2022-04-11 PROCEDURE — 93000 ELECTROCARDIOGRAM COMPLETE: CPT

## 2022-04-11 PROCEDURE — 99214 OFFICE O/P EST MOD 30 MIN: CPT

## 2022-04-11 NOTE — CARDIOLOGY SUMMARY
[de-identified] : Sinus  Rhythm  -Left atrial enlargement.   -  Nonspecific T-abnormality.   ABNORMAL

## 2022-04-11 NOTE — PHYSICAL EXAM
[Well Developed] : well developed [Well Nourished] : well nourished [No Acute Distress] : no acute distress [Normal Conjunctiva] : normal conjunctiva [Normal Venous Pressure] : normal venous pressure [Normal S1, S2] : normal S1, S2 [No Murmur] : no murmur [No Rub] : no rub [No Gallop] : no gallop [1+] : left 1+ [Clear Lung Fields] : clear lung fields [Good Air Entry] : good air entry [No Respiratory Distress] : no respiratory distress  [Soft] : abdomen soft [Non Tender] : non-tender [No Masses/organomegaly] : no masses/organomegaly [Normal Bowel Sounds] : normal bowel sounds [Normal Gait] : normal gait [No Edema] : no edema [No Cyanosis] : no cyanosis [No Clubbing] : no clubbing [No Varicosities] : no varicosities [No Rash] : no rash [No Skin Lesions] : no skin lesions [Moves all extremities] : moves all extremities [No Focal Deficits] : no focal deficits [Normal Speech] : normal speech [Alert and Oriented] : alert and oriented [Normal memory] : normal memory [Right Carotid Bruit] : no bruit heard over the right carotid [Left Carotid Bruit] : no bruit heard over the left carotid

## 2022-04-11 NOTE — DISCUSSION/SUMMARY
[___ Month(s)] : in [unfilled] month(s) [FreeTextEntry1] : Continue current amlodipine for blood pressure reduction.  Continue aspirin and statin for cardiac risk reduction.  No cardiac testing indicated at present.  Renewed her medication amlodipine and simvastatin.  Age-appropriate activities and appropriate cardiac diet recommended.  See me in 6 months or sooner if needed and follow-up with you as well.

## 2022-04-11 NOTE — HISTORY OF PRESENT ILLNESS
[FreeTextEntry1] : Pleasant 81-year-old with hypertensive heart disease, dyslipidemia, TIA is had poor appetite but lately her appetite has picked up but she follows up in the office today no current chest complaints reported.  Available labs and cardiac data reviewed.

## 2022-04-26 NOTE — CURRENT MEDS
[Takes medication as prescribed] : takes [None] : Patient does not have any barriers to medication adherence [Chaperone Present] : A chaperone was present in the examining room during all aspects of the physical examination [FreeTextEntry1] : PH [Appropriately responsive] : appropriately responsive [Alert] : alert [No Acute Distress] : no acute distress [No Lymphadenopathy] : no lymphadenopathy [Regular Rate Rhythm] : regular rate rhythm [No Murmurs] : no murmurs [Clear to Auscultation B/L] : clear to auscultation bilaterally [Soft] : soft [Non-tender] : non-tender [Non-distended] : non-distended [No HSM] : No HSM [No Lesions] : no lesions [No Mass] : no mass [Oriented x3] : oriented x3 [Examination Of The Breasts] : a normal appearance [No Masses] : no breast masses were palpable [Labia Majora] : normal [Labia Minora] : normal [Atrophy] : atrophy [No Bleeding] : There was no active vaginal bleeding [Normal] : normal [Uterine Adnexae] : normal [FreeTextEntry4] : Introitus stenotic with extreme atrophy

## 2022-06-16 ENCOUNTER — RX RENEWAL (OUTPATIENT)
Age: 82
End: 2022-06-16

## 2022-06-22 ENCOUNTER — RX RENEWAL (OUTPATIENT)
Age: 82
End: 2022-06-22

## 2022-07-27 ENCOUNTER — RX RENEWAL (OUTPATIENT)
Age: 82
End: 2022-07-27

## 2022-07-29 ENCOUNTER — APPOINTMENT (OUTPATIENT)
Dept: NEUROLOGY | Facility: CLINIC | Age: 82
End: 2022-07-29

## 2022-07-29 PROCEDURE — 99212 OFFICE O/P EST SF 10 MIN: CPT | Mod: 25

## 2022-07-29 PROCEDURE — 95909 NRV CNDJ TST 5-6 STUDIES: CPT

## 2022-07-29 NOTE — DISCUSSION/SUMMARY
[FreeTextEntry1] : 81-year-old woman who is here for nerve conduction and EMG studies.  With no signs of large fiber neuropathy will have patient follow-up with her PMD to evaluate for any cardiac or renal causes of her pedal edema.  Of note patient had no pedal edema during exam today.  Patient will follow-up in a couple of weeks.\par \par I spent the time noted on the day of this patient encounter preparing for, providing and documenting the above E/M service and counseling and educate patient on differential, workup, disease course, and treatment/management. Education was provided to the patient during this encounter. All questions and concerns were answered and addressed in detail. The patient verbalized understanding and agreed to plan. Patient was advised to continue to monitor for neurologic symptoms and to notify my office or go to the nearest emergency room if there are any changes. Any orders/referrals and communications were provided as well. \par Side effects of the above medications were discussed in detail including but not limited to applicable black box warning and teratogenicity as appropriate. \par Patient was advised to bring previous records to my office. \par \par \par

## 2022-07-29 NOTE — PROCEDURE
[FreeTextEntry1] :   \par                                                                       \par \par Clinical \par Neurophysiology \par Laboratory\par [address] [facility]\[place] [address]\par \par EMG/NCV REPORT\par   \par Full Name:	dioni sam	YOB: 1940\par Gender:	Female\par   \par Visit Date:	7/29/2022 13:34\par Age:	81 Years 8 Months Old\par Examining Physician:	dena\leeann Referring Physician:	dena\par Height:	5 feet 8 inch\par   \par Summary\par Sensory nerve conductions\par Right sural sensory nerve action potential had normal latency, normal amplitude and normal conduction velocity.\par \par Motor nerve conductions\par \par Bilateral peroneal nerve compound motor action potential had normal latency, normal amplitude and normal conduction velocity.\par \par Left tibial nerve compound motor action potential had normal latency, decreased amplitude and normal conduction velocity.  This may be due to technical reasons.\par \par Right tibial nerve compound motor action potential had normal latency, normal amplitude and normal conduction velocity with decreased amplitude with proximal stimulation.  This is due to technical reasons.\par \par The F-wave latency of the bilateral peroneal were within normal limits.  The F-wave latency of bilateral tibial nerves were prolonged.\par \par Needle EMG\par Needle EMG was deferred.  \par \par Summary: There is no evidence of generalized large fiber neuropathy in the lower extremities. \par \par \par Clinical and radiologic correlation is advised. \par Thank you for the consult,\par Kaleb Harley MD\par \par Sensory NCS\par   \par Nerve / Sites	Rec. Site	Onset Lat	Peak Lat	NP Amp	Segments	Distance	Velocity\par 		ms	ms	µV		cm	m/s\par R Sural - Ankle (Calf)\par    Calf	Ankle	2.55	3.33	10.8	Calf - Ankle	12	47\par      \par Motor NCS\par   \par Nerve / Sites	Muscle	Latency	Amplitude	Amp %	Duration	Segments	Distance	Lat Diff	Velocity\par 		ms	mV	%	ms		cm	ms	m/s\par L Peroneal - EDB\par    Ankle	EDB	4.38	3.4	100	4.95	Ankle - EDB	10		\par    Fib head	EDB	12.71	3.0	88.4	6.51	Fib head - Ankle	33	8.33	40\par    Pop fossa	EDB	15.42	3.6	106	7.60	Pop fossa - Fib head	11	2.71	41\par R Peroneal - EDB\par    Ankle	EDB	4.95	5.3	100	5.73	Ankle - EDB	10		\par    Fib head	EDB	12.19	4.8	91.3	6.46	Fib head - Ankle	35	7.24	48\par    Pop fossa	EDB	14.64	4.4	82.4	6.41	Pop fossa - Fib head	14	2.45	57\par L Tibial - AH\par    Ankle	AH	4.11	3.0	100	6.56	Ankle - AH	8		\par    Pop fossa	AH	13.80	1.0	35	6.82	Pop fossa - Ankle	40	9.69	41\par R Tibial - AH\par    Ankle	AH	4.95	3.7	100	5.68	Ankle - AH	7		\par    Pop fossa	AH	14.64	1.7	44.7	6.98	Pop fossa - Ankle	39	9.69	40\par               \par F  Wave\par   \par Nerve	F Lat	M Lat	F-M Lat\par 	ms	ms	ms\par L Peroneal - EDB	56.6	4.2	52.4\par L Tibial - AH	59.4	4.6	54.7\par R Tibial - AH	62.1	5.2	56.9\par R Peroneal - EDB	56.5	5.2	51.4\par             \par  \par

## 2022-07-29 NOTE — HISTORY OF PRESENT ILLNESS
[FreeTextEntry1] : 81 W who is here for followup of her lewy Body's dementia. She has been going to PT and gait has increased in stability. She had some hallucination of books near her table. We discussed not giving any antipsychotics as pt is well aware of the hallucinations. Marilee was concerned b/c there are certain times of day when she's alone. She wanted to try olanzapine. AFter discussion, Pt was agreeable to trying it. Marilee also states that she sometimes puts her clothes on inside out. She wishes to try occupational therapy.\par \par Interval history: The olanzapine has not done anything to help her sleeping issues.  It appears she takes naps and she does not sleep at night however she sleeps in until the early afternoon.  Marilee had recommended writing specifics on the Occupational Therapy.\par \par Interval history: Patient continues to have unsteadiness that is worse intermittently.  Patient states that when she does have her lightheaded spells her heart is beating very fast.  Patient's blood pressure is125/78 with a pulse of 75.  Patient seems to have a glazed over look whenever she has these episodes\par Patient has not started physical therapy or occupational therapy.\par \par Interval history: Patient is wearing the Holter monitor to evaluate for any signs of arrhythmia.  Patient had a EEG which was unremarkable.  Patient's blood pressure seems to be in the 120s over 70s when family checks it at home.  Patient has upcoming neuropsych evaluation in July.  Patient benefited from the occupational therapy and physical therapy and would like to continue the treatment.  \par \par Interval history: Patient's daughter states that the occupational and physical therapist states that they no longer want to continue her sessions despite new prescription from me.  Patient is noted to have falls where she would stop while she is walking and then turn her head to the left.  Patient would not respond for 2 to 3 minutes during these episodes.  Patient's daughter is in the process of getting more help as she has only 4 hours of aide.  Patient tends to fall and needs help with activities of daily living.  Patient needs help with dressing and with task transitions.  Patient's daughter is her only caretaker and she works full-time and on the weekends she cares for her mother.  Patient's daughter is experiencing burnout.  Patient is also noted to have anxiety and worries where there is decreased social interactions even when she is at any adult day programs.  Patient was asked and she denied any crying or signs of depression.\par \par Interval history: Daughter noticed that patient has episodes of bilateral pedal edema.  Patient is here for nerve conduction and EMG studies.

## 2022-08-01 ENCOUNTER — OUTPATIENT (OUTPATIENT)
Dept: OUTPATIENT SERVICES | Facility: HOSPITAL | Age: 82
LOS: 1 days | End: 2022-08-01
Payer: MEDICARE

## 2022-08-01 ENCOUNTER — APPOINTMENT (OUTPATIENT)
Dept: MAMMOGRAPHY | Facility: CLINIC | Age: 82
End: 2022-08-01

## 2022-08-01 ENCOUNTER — RESULT REVIEW (OUTPATIENT)
Age: 82
End: 2022-08-01

## 2022-08-01 DIAGNOSIS — Z00.00 ENCOUNTER FOR GENERAL ADULT MEDICAL EXAMINATION WITHOUT ABNORMAL FINDINGS: ICD-10-CM

## 2022-08-01 PROCEDURE — 77067 SCR MAMMO BI INCL CAD: CPT

## 2022-08-01 PROCEDURE — 77063 BREAST TOMOSYNTHESIS BI: CPT | Mod: 26

## 2022-08-01 PROCEDURE — 77063 BREAST TOMOSYNTHESIS BI: CPT

## 2022-08-01 PROCEDURE — 77067 SCR MAMMO BI INCL CAD: CPT | Mod: 26

## 2022-08-02 DIAGNOSIS — N60.01 SOLITARY CYST OF RIGHT BREAST: ICD-10-CM

## 2022-08-11 ENCOUNTER — APPOINTMENT (OUTPATIENT)
Dept: ULTRASOUND IMAGING | Facility: CLINIC | Age: 82
End: 2022-08-11

## 2022-08-11 ENCOUNTER — RESULT REVIEW (OUTPATIENT)
Age: 82
End: 2022-08-11

## 2022-08-11 ENCOUNTER — OUTPATIENT (OUTPATIENT)
Dept: OUTPATIENT SERVICES | Facility: HOSPITAL | Age: 82
LOS: 1 days | End: 2022-08-11
Payer: MEDICARE

## 2022-08-11 DIAGNOSIS — Z00.8 ENCOUNTER FOR OTHER GENERAL EXAMINATION: ICD-10-CM

## 2022-08-11 PROCEDURE — 76642 ULTRASOUND BREAST LIMITED: CPT

## 2022-08-11 PROCEDURE — 76642 ULTRASOUND BREAST LIMITED: CPT | Mod: 26,RT

## 2022-08-16 ENCOUNTER — RESULT REVIEW (OUTPATIENT)
Age: 82
End: 2022-08-16

## 2022-08-17 ENCOUNTER — RESULT REVIEW (OUTPATIENT)
Age: 82
End: 2022-08-17

## 2022-08-17 ENCOUNTER — APPOINTMENT (OUTPATIENT)
Dept: ULTRASOUND IMAGING | Facility: CLINIC | Age: 82
End: 2022-08-17

## 2022-08-17 ENCOUNTER — OUTPATIENT (OUTPATIENT)
Dept: OUTPATIENT SERVICES | Facility: HOSPITAL | Age: 82
LOS: 1 days | End: 2022-08-17
Payer: MEDICARE

## 2022-08-17 DIAGNOSIS — N60.01 SOLITARY CYST OF RIGHT BREAST: ICD-10-CM

## 2022-08-17 DIAGNOSIS — R92.8 OTHER ABNORMAL AND INCONCLUSIVE FINDINGS ON DIAGNOSTIC IMAGING OF BREAST: ICD-10-CM

## 2022-08-17 PROCEDURE — 19000 PUNCTURE ASPIR CYST BREAST: CPT

## 2022-08-17 PROCEDURE — 88173 CYTOPATH EVAL FNA REPORT: CPT

## 2022-08-17 PROCEDURE — 19000 PUNCTURE ASPIR CYST BREAST: CPT | Mod: RT

## 2022-08-17 PROCEDURE — 88173 CYTOPATH EVAL FNA REPORT: CPT | Mod: 26

## 2022-08-17 PROCEDURE — 76942 ECHO GUIDE FOR BIOPSY: CPT | Mod: 26

## 2022-08-17 PROCEDURE — 76942 ECHO GUIDE FOR BIOPSY: CPT

## 2022-08-18 LAB — NON-GYNECOLOGICAL CYTOLOGY STUDY: SIGNIFICANT CHANGE UP

## 2022-08-30 ENCOUNTER — NON-APPOINTMENT (OUTPATIENT)
Age: 82
End: 2022-08-30

## 2022-09-15 NOTE — CONSULT NOTE ADULT - CONSULT REQUESTED DATE/TIME
9/15/22  Senait Scott : 1946 Sex: female  Age 78 y.o. Subjective:  Chief Complaint   Patient presents with    Pharyngitis    Cough    Drainage     Home test Negative COVID did not want swabbed         HPI:   Senait Scott , 78 y.o. female presents to express care for evaluation of sinus congestion, drainage, cough, sore throat    HPI  81-year-old female presents to express care for evaluation of sore throat, congestion, f drainage. The patient has had the symptoms ongoing since Monday. The patient has a minimal cough. The patient states that this is pretty typical of her. The patient is not having any fevers. The patient's any chest pain, shortness of breath. No back pain, flank pain. The patient is eating and drinking normally. The patient is not currently on any antibiotics. The patient did do a home COVID test that was negative. ROS:   Unless otherwise stated in this report the patient's positive and negative responses for review of systems for constitutional, eyes, ENT, cardiovascular, respiratory, gastrointestinal, neurological, , musculoskeletal, and integument systems and related systems to the presenting problem are either stated in the history of present illness or were not pertinent or were negative for the symptoms and/or complaints related to the presenting medical problem. Positives and pertinent negatives as per HPI. All others reviewed and are negative.       PMH:     Past Medical History:   Diagnosis Date    Acid reflux     PAU (generalized anxiety disorder)     History of cardiovascular stress test 2016    on chart    Hypertension     Osteoarthritis     Preoperative clearance 2016    per Dr Sukumar Coles on chart    Vitamin D deficiency        Past Surgical History:   Procedure Laterality Date    JOINT REPLACEMENT Right 2016    knee       Family History   Problem Relation Age of Onset    Other Mother         dementia    Cancer Father         Esophagus  age [de-identified]    No Known Problems Brother     No Known Problems Other     Heart Disease Brother         coronary stent    No Known Problems Son     No Known Problems Son     No Known Problems Daughter        Medications:     Current Outpatient Medications:     amoxicillin (AMOXIL) 875 MG tablet, Take 1 tablet by mouth 2 times daily for 10 days, Disp: 20 tablet, Rfl: 0    levothyroxine (SYNTHROID) 25 MCG tablet, Take one tablet 3 days a week and 2 tablets four days a week., Disp: 134 tablet, Rfl: 1    metoprolol succinate (TOPROL XL) 25 MG extended release tablet, TAKE ONE TABLET BY MOUTH DAILY, Disp: 90 tablet, Rfl: 1    irbesartan-hydroCHLOROthiazide (AVALIDE) 300-12.5 MG per tablet, Take 1 tablet by mouth daily, Disp: 90 tablet, Rfl: 1    levothyroxine (SYNTHROID) 50 MCG tablet, 1 po qd, Disp: 30 tablet, Rfl: 0    Multiple Vitamins-Minerals (ALIVE WOMENS 50+) TABS, Take 1 tablet by mouth daily States instructed to hold 7 days preop per Dr Gin Galindo (Patient not taking: Reported on 2021), Disp: , Rfl:     Allergies: Allergies   Allergen Reactions    Levaquin [Levofloxacin] Other (See Comments)     Muscle aches       Social History:     Social History     Tobacco Use    Smoking status: Never    Smokeless tobacco: Never   Vaping Use    Vaping Use: Never used   Substance Use Topics    Alcohol use: Yes     Alcohol/week: 4.0 standard drinks     Types: 4 Glasses of wine per week     Comment: socially wine    Drug use: No       Patient lives at home. Physical Exam:     Vitals:    09/15/22 0826   BP: (!) 168/88   Site: Right Upper Arm   Position: Sitting   Cuff Size: Medium Adult   Pulse: 72   Resp: 20   Temp: 97.3 °F (36.3 °C)   TempSrc: Temporal   SpO2: 98%   Weight: 150 lb (68 kg)   Height: 5' 4.5\" (1.638 m)       Exam:  Physical Exam  Nurse's notes and vital signs reviewed. The patient is not hypoxic. ? General: Alert, no acute distress, patient resting comfortably Patient is not toxic or lethargic.   Skin: 26-Oct-2019 19:51 Warm, intact, no pallor noted. There is no evidence of rash at this time. Head: Normocephalic, atraumatic  Eye: Normal conjunctiva  Ears, Nose, Throat: Right tympanic membrane clear, left tympanic membrane clear. No drainage or discharge noted. No pre- or post-auricular tenderness, erythema, or swelling noted. Nasal congestion, rhinorrhea  Posterior oropharynx shows erythema but no evidence of tonsillar hypertrophy, or exudate. the uvula is midline. No trismus or drooling is noted. Moist mucous membranes. Cardiovascular: Regular Rate and Rhythm  Respiratory: No acute distress, no rhonchi, wheezing or crackles noted. No stridor or retractions are noted. Neurological: A&O x4, normal speech  Psychiatric: Cooperative       Testing:           Medical Decision Making:     Vital signs reviewed    Past medical history reviewed. Allergies reviewed. Medications reviewed. Patient on arrival does not appear to be in any apparent distress or discomfort. The patient has been seen and evaluated. The patient does not appear to be toxic or lethargic. The patient will be treated with amoxicillin. The patient did not want further testing done here in the office. The patient was educated on the proper dosage of motrin and tylenol and the appropriate intervals of each. The patient is to increase fluid intake over the next several days. The patient is to use OTC decongestant as needed. The patient is to return to express care or go directly to the emergency department should any of the signs or symptoms worsen. The patient is to followup with primary care physician in 2-3 days for repeat evaluation. The patient has no other questions or concerns at this time the patient will be discharged home. Clinical Impression:   [facility] was seen today for pharyngitis, cough and drainage.     Diagnoses and all orders for this visit:    Nasal congestion    Postnasal drip    Pain in throat    Other orders  - amoxicillin (AMOXIL) 875 MG tablet; Take 1 tablet by mouth 2 times daily for 10 days      The patient is to call for any concerns or return if any of the signs or symptoms worsen. The patient is to follow-up with PCP in the next 2-3 days for repeat evaluation repeat assessment or go directly to the emergency department.      SIGNATURE: Junior Green III, PA-C

## 2022-10-12 ENCOUNTER — APPOINTMENT (OUTPATIENT)
Dept: INTERNAL MEDICINE | Facility: CLINIC | Age: 82
End: 2022-10-12

## 2022-11-21 ENCOUNTER — APPOINTMENT (OUTPATIENT)
Dept: INTERNAL MEDICINE | Facility: CLINIC | Age: 82
End: 2022-11-21

## 2022-11-29 ENCOUNTER — APPOINTMENT (OUTPATIENT)
Dept: INTERNAL MEDICINE | Facility: CLINIC | Age: 82
End: 2022-11-29

## 2022-11-29 ENCOUNTER — APPOINTMENT (OUTPATIENT)
Dept: NEUROLOGY | Facility: CLINIC | Age: 82
End: 2022-11-29

## 2022-11-29 VITALS
BODY MASS INDEX: 23.86 KG/M2 | SYSTOLIC BLOOD PRESSURE: 101 MMHG | WEIGHT: 152 LBS | DIASTOLIC BLOOD PRESSURE: 72 MMHG | HEIGHT: 67 IN | HEART RATE: 102 BPM

## 2022-11-29 PROCEDURE — 90662 IIV NO PRSV INCREASED AG IM: CPT

## 2022-11-29 PROCEDURE — G0008: CPT

## 2022-11-29 PROCEDURE — 99215 OFFICE O/P EST HI 40 MIN: CPT

## 2022-11-29 NOTE — PHYSICAL EXAM
[General Appearance - Alert] : alert [Affect] : the affect was normal [Person] : oriented to person [Place] : disoriented to place [Time] : oriented to time [Cranial Nerves Optic (II)] : visual acuity intact bilaterally,  visual fields full to confrontation, pupils equal round and reactive to light [Cranial Nerves Oculomotor (III)] : extraocular motion intact [Cranial Nerves Vestibulocochlear (VIII)] : hearing was intact bilaterally [Cranial Nerves Accessory (XI - Cranial And Spinal)] : head turning and shoulder shrug symmetric [Motor Tone] : muscle tone was normal in all four extremities [Paresis Pronator Drift Right-Sided] : no pronator drift on the right [Paresis Pronator Drift Left-Sided] : no pronator drift on the left [Sensation Tactile Decrease] : light touch was intact [Coordination - Dysmetria Impaired Finger-to-Nose Bilateral] : not present [1+] : Patella left 1+ [FreeTextEntry6] : decreased muscle bulk and strength.  [FreeTextEntry8] : stands from wheelchair but feels unsteady. She didn't walk

## 2022-11-29 NOTE — HISTORY OF PRESENT ILLNESS
[FreeTextEntry1] : 82 W who is here for followup of her lewy Body's dementia. She has been going to PT and gait has increased in stability. She had some hallucination of books near her table. We discussed not giving any antipsychotics as pt is well aware of the hallucinations. Marilee was concerned b/c there are certain times of day when she's alone. She wanted to try olanzapine. AFter discussion, Pt was agreeable to trying it. Marilee also states that she sometimes puts her clothes on inside out. She wishes to try occupational therapy.\par \par Interval history: The olanzapine has not done anything to help her sleeping issues.  It appears she takes naps and she does not sleep at night however she sleeps in until the early afternoon.  Marilee had recommended writing specifics on the Occupational Therapy.\par \par Interval history: Patient continues to have unsteadiness that is worse intermittently.  Patient states that when she does have her lightheaded spells her heart is beating very fast.  Patient's blood pressure is125/78 with a pulse of 75.  Patient seems to have a glazed over look whenever she has these episodes\par Patient has not started physical therapy or occupational therapy.\par \par Interval history: Patient is wearing the Holter monitor to evaluate for any signs of arrhythmia.  Patient had a EEG which was unremarkable.  Patient's blood pressure seems to be in the 120s over 70s when family checks it at home.  Patient has upcoming neuropsych evaluation in July.  Patient benefited from the occupational therapy and physical therapy and would like to continue the treatment.  \par \par Interval history: Patient's daughter states that the occupational and physical therapist states that they no longer want to continue her sessions despite new prescription from me.  Patient is noted to have falls where she would stop while she is walking and then turn her head to the left.  Patient would not respond for 2 to 3 minutes during these episodes.  Patient's daughter is in the process of getting more help as she has only 4 hours of aide.  Patient tends to fall and needs help with activities of daily living.  Patient needs help with dressing and with task transitions.  Patient's daughter is her only caretaker and she works full-time and on the weekends she cares for her mother.  Patient's daughter is experiencing burnout.  Patient is also noted to have anxiety and worries where there is decreased social interactions even when she is at any adult day programs.  Patient was asked and she denied any crying or signs of depression.\par \par Interval history November 29, 2022: Since her last visit patient had a normal EEG and has no further pedal edema.  Patient has increased frequency of falls where she would activate the ADT monitoring system.  Patient is not sleeping at night for she has her days and nights reversed.  Patient is in the process of getting a lift for her staircase as she lives in the top floor and it is difficult for her to even stand from a seated position.  Patient needs help with dressing and activities of daily living.  Patient definitely needs a night time aide. Her hallucinations are worsening. \par

## 2022-12-06 ENCOUNTER — NON-APPOINTMENT (OUTPATIENT)
Age: 82
End: 2022-12-06

## 2022-12-12 ENCOUNTER — RX RENEWAL (OUTPATIENT)
Age: 82
End: 2022-12-12

## 2022-12-30 ENCOUNTER — NON-APPOINTMENT (OUTPATIENT)
Age: 82
End: 2022-12-30

## 2022-12-30 ENCOUNTER — APPOINTMENT (OUTPATIENT)
Dept: CARDIOLOGY | Facility: CLINIC | Age: 82
End: 2022-12-30
Payer: MEDICARE

## 2022-12-30 VITALS
DIASTOLIC BLOOD PRESSURE: 80 MMHG | OXYGEN SATURATION: 90 % | BODY MASS INDEX: 20.72 KG/M2 | WEIGHT: 132 LBS | SYSTOLIC BLOOD PRESSURE: 102 MMHG | HEART RATE: 94 BPM | HEIGHT: 67 IN

## 2022-12-30 DIAGNOSIS — R94.31 ABNORMAL ELECTROCARDIOGRAM [ECG] [EKG]: ICD-10-CM

## 2022-12-30 PROCEDURE — 99214 OFFICE O/P EST MOD 30 MIN: CPT

## 2022-12-30 PROCEDURE — 93000 ELECTROCARDIOGRAM COMPLETE: CPT

## 2022-12-30 RX ORDER — ASPIRIN ENTERIC COATED TABLETS 81 MG 81 MG/1
81 TABLET, DELAYED RELEASE ORAL
Qty: 90 | Refills: 3 | Status: ACTIVE | COMMUNITY
Start: 2018-08-30 | End: 1900-01-01

## 2023-01-11 LAB
25(OH)D3 SERPL-MCNC: 82.2 NG/ML
ALBUMIN SERPL ELPH-MCNC: 3.9 G/DL
ALP BLD-CCNC: 112 U/L
ALT SERPL-CCNC: 12 U/L
ANION GAP SERPL CALC-SCNC: 10 MMOL/L
AST SERPL-CCNC: 16 U/L
BASOPHILS # BLD AUTO: 0.04 K/UL
BASOPHILS NFR BLD AUTO: 0.4 %
BILIRUB SERPL-MCNC: 0.3 MG/DL
BUN SERPL-MCNC: 14 MG/DL
CALCIUM SERPL-MCNC: 10.3 MG/DL
CHLORIDE SERPL-SCNC: 105 MMOL/L
CHOLEST SERPL-MCNC: 158 MG/DL
CO2 SERPL-SCNC: 26 MMOL/L
CREAT SERPL-MCNC: 0.99 MG/DL
EGFR: 58 ML/MIN/1.73M2
EOSINOPHIL # BLD AUTO: 0.06 K/UL
EOSINOPHIL NFR BLD AUTO: 0.6 %
ESTIMATED AVERAGE GLUCOSE: 123 MG/DL
GLUCOSE SERPL-MCNC: 83 MG/DL
HBA1C MFR BLD HPLC: 5.9 %
HCT VFR BLD CALC: 40.9 %
HDLC SERPL-MCNC: 61 MG/DL
HGB BLD-MCNC: 13 G/DL
IMM GRANULOCYTES NFR BLD AUTO: 0.2 %
LDLC SERPL CALC-MCNC: 82 MG/DL
LYMPHOCYTES # BLD AUTO: 2.78 K/UL
LYMPHOCYTES NFR BLD AUTO: 29.2 %
MAN DIFF?: NORMAL
MCHC RBC-ENTMCNC: 31.8 GM/DL
MCHC RBC-ENTMCNC: 32.4 PG
MCV RBC AUTO: 102 FL
MONOCYTES # BLD AUTO: 0.92 K/UL
MONOCYTES NFR BLD AUTO: 9.7 %
NEUTROPHILS # BLD AUTO: 5.69 K/UL
NEUTROPHILS NFR BLD AUTO: 59.9 %
NONHDLC SERPL-MCNC: 97 MG/DL
PLATELET # BLD AUTO: 266 K/UL
POTASSIUM SERPL-SCNC: 4.5 MMOL/L
PROT SERPL-MCNC: 6.6 G/DL
RBC # BLD: 4.01 M/UL
RBC # FLD: 13 %
SODIUM SERPL-SCNC: 141 MMOL/L
TRIGL SERPL-MCNC: 79 MG/DL
TSH SERPL-ACNC: 1.04 UIU/ML
WBC # FLD AUTO: 9.51 K/UL

## 2023-02-09 RX ORDER — NUT.TX.IMPAIRED DIGESTIVE FXN 0.035-1/ML
LIQUID (ML) ORAL
Qty: 3 | Refills: 0 | Status: ACTIVE | OUTPATIENT
Start: 2022-03-07

## 2023-02-19 ENCOUNTER — NON-APPOINTMENT (OUTPATIENT)
Age: 83
End: 2023-02-19

## 2023-02-19 NOTE — HISTORY OF PRESENT ILLNESS
[FreeTextEntry1] : She is a pleasant 82-year-old with history of TIA, dyslipidemia, hypertensive heart disease, history of paroxysmal atrial tachycardia and Lewy body dementia comes to the office today for cardiac follow-up. Available labs and cardiac data reviewed.  Losing weight with poor appetite noted.

## 2023-02-19 NOTE — DISCUSSION/SUMMARY
[___ Month(s)] : in [unfilled] month(s) [FreeTextEntry1] : I renewed her amlodipine 2.5 mg daily for blood pressure management and symptoms for lipid lowering.  Aspirin 81 mg daily for cardiovascular risk reduction.  Given weight loss and poor appetite, dronabinol 2.5 mg twice daily given for weight gain.  Supportive care at this point.  No cardiac testing indicated and she will follow-up with you for care as well.

## 2023-02-20 ENCOUNTER — APPOINTMENT (OUTPATIENT)
Dept: OBGYN | Facility: CLINIC | Age: 83
End: 2023-02-20
Payer: MEDICARE

## 2023-02-20 PROCEDURE — 99203 OFFICE O/P NEW LOW 30 MIN: CPT

## 2023-02-21 ENCOUNTER — LABORATORY RESULT (OUTPATIENT)
Age: 83
End: 2023-02-21

## 2023-02-21 ENCOUNTER — APPOINTMENT (OUTPATIENT)
Dept: INTERNAL MEDICINE | Facility: CLINIC | Age: 83
End: 2023-02-21
Payer: MEDICARE

## 2023-02-21 VITALS
DIASTOLIC BLOOD PRESSURE: 60 MMHG | OXYGEN SATURATION: 98 % | RESPIRATION RATE: 16 BRPM | HEART RATE: 66 BPM | BODY MASS INDEX: 20.56 KG/M2 | SYSTOLIC BLOOD PRESSURE: 102 MMHG | WEIGHT: 131 LBS | HEIGHT: 67 IN

## 2023-02-21 DIAGNOSIS — D75.89 OTHER SPECIFIED DISEASES OF BLOOD AND BLOOD-FORMING ORGANS: ICD-10-CM

## 2023-02-21 DIAGNOSIS — R39.198 OTHER DIFFICULTIES WITH MICTURITION: ICD-10-CM

## 2023-02-21 DIAGNOSIS — Z80.3 FAMILY HISTORY OF MALIGNANT NEOPLASM OF BREAST: ICD-10-CM

## 2023-02-21 DIAGNOSIS — R73.03 PREDIABETES.: ICD-10-CM

## 2023-02-21 DIAGNOSIS — I11.9 HYPERTENSIVE HEART DISEASE W/OUT HEART FAILURE: ICD-10-CM

## 2023-02-21 PROCEDURE — 99213 OFFICE O/P EST LOW 20 MIN: CPT

## 2023-02-21 NOTE — REVIEW OF SYSTEMS
[Negative] : Genitourinary [FreeTextEntry2] : Thin female sitting in chair [FreeTextEntry4] : Wears hearing aids [FreeTextEntry8] : Dors is a burning on urination

## 2023-02-21 NOTE — HEALTH RISK ASSESSMENT
[Good] : ~his/her~  mood as  good [No] : In the past 12 months have you used drugs other than those required for medical reasons? No [0] : 2) Feeling down, depressed, or hopeless: Not at all (0) [Alone] : lives alone [Retired] : retired [College] : College [] :  [Never] : Never [MammogramComments] :  Reports she had a mammogram in August 2022 [ColonoscopyComments] : Reports she had a colonoscopy 2 years [de-identified] : Reports that she has a Home health aid from 9-5. On the weekends, the home health is from 10-6. Daughter reports that she is with the patient at night at times. 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 Needs assistance with both by another human. She does eat by herself at times, but needs small bites for that to happen.  Daughter reports that the patient is not able to dress, bathe, toilet by herself, she needs assistance from another person.

## 2023-02-21 NOTE — HISTORY OF PRESENT ILLNESS
[FreeTextEntry1] : 82-year-old female is seen today\par  [de-identified] : 82-year-old female with history of hyperlipidemia, hypertension, Lewy body dementia seen today.  This my first time evaluating this patient.\par \par Patient denies any fevers, chills, nausea, vomiting, diarrhea, constipation, chest pain, shortness of breath, weakness, numbness, tingling.\par \par Pt is companied by her daughter whose name is Aruna.  Reports that she will take care of the mother.  She is requesting some forms to be completed.\par \par She reports that the patient did get the flu vaccine this year\par She reports that the patient has had 3 covid vaccines\par \par Reports the patient went to the GYN a few days ago for cloudy urine, they could not get a UA and the patient's daughter is requesting a UA to be done today.\par

## 2023-02-21 NOTE — PLAN
[FreeTextEntry1] : She may follow-up earlier if needed\par Pt was told to call with problems or concerns. The patient was told to seek immediate attention by calling 911 or going to the ER if her condition does not improve or gets acutely worse.\par

## 2023-02-22 ENCOUNTER — NON-APPOINTMENT (OUTPATIENT)
Age: 83
End: 2023-02-22

## 2023-02-22 DIAGNOSIS — N39.0 URINARY TRACT INFECTION, SITE NOT SPECIFIED: ICD-10-CM

## 2023-02-22 LAB
FOLATE SERPL-MCNC: >20 NG/ML
VIT B12 SERPL-MCNC: 976 PG/ML

## 2023-02-22 RX ORDER — GRAPE SEED EXTRACT 50 MG
1250 (500 CA) TABLET ORAL DAILY
Qty: 30 | Refills: 5 | Status: ACTIVE | COMMUNITY
Start: 2023-02-22 | End: 1900-01-01

## 2023-02-23 LAB
APPEARANCE: ABNORMAL
BILIRUBIN URINE: NEGATIVE
BLOOD URINE: NEGATIVE
COLOR: YELLOW
GLUCOSE QUALITATIVE U: NEGATIVE
KETONES URINE: NEGATIVE
LEUKOCYTE ESTERASE URINE: ABNORMAL
NITRITE URINE: NEGATIVE
PH URINE: 7
PROTEIN URINE: ABNORMAL
SPECIFIC GRAVITY URINE: 1.02
UROBILINOGEN URINE: NORMAL

## 2023-02-24 ENCOUNTER — TRANSCRIPTION ENCOUNTER (OUTPATIENT)
Age: 83
End: 2023-02-24

## 2023-02-24 ENCOUNTER — EMERGENCY (EMERGENCY)
Facility: HOSPITAL | Age: 83
LOS: 0 days | Discharge: HOME | End: 2023-02-25
Attending: EMERGENCY MEDICINE
Payer: MEDICARE

## 2023-02-24 VITALS
DIASTOLIC BLOOD PRESSURE: 85 MMHG | SYSTOLIC BLOOD PRESSURE: 139 MMHG | TEMPERATURE: 99 F | HEART RATE: 110 BPM | HEIGHT: 65 IN | OXYGEN SATURATION: 98 % | RESPIRATION RATE: 20 BRPM | WEIGHT: 130.95 LBS

## 2023-02-24 DIAGNOSIS — Z88.0 ALLERGY STATUS TO PENICILLIN: ICD-10-CM

## 2023-02-24 DIAGNOSIS — K59.00 CONSTIPATION, UNSPECIFIED: ICD-10-CM

## 2023-02-24 DIAGNOSIS — E78.5 HYPERLIPIDEMIA, UNSPECIFIED: ICD-10-CM

## 2023-02-24 DIAGNOSIS — R00.0 TACHYCARDIA, UNSPECIFIED: ICD-10-CM

## 2023-02-24 DIAGNOSIS — Z20.822 CONTACT WITH AND (SUSPECTED) EXPOSURE TO COVID-19: ICD-10-CM

## 2023-02-24 DIAGNOSIS — I10 ESSENTIAL (PRIMARY) HYPERTENSION: ICD-10-CM

## 2023-02-24 DIAGNOSIS — E86.0 DEHYDRATION: ICD-10-CM

## 2023-02-24 DIAGNOSIS — N39.0 URINARY TRACT INFECTION, SITE NOT SPECIFIED: ICD-10-CM

## 2023-02-24 DIAGNOSIS — F03.90 UNSPECIFIED DEMENTIA, UNSPECIFIED SEVERITY, WITHOUT BEHAVIORAL DISTURBANCE, PSYCHOTIC DISTURBANCE, MOOD DISTURBANCE, AND ANXIETY: ICD-10-CM

## 2023-02-24 LAB
ALBUMIN SERPL ELPH-MCNC: 3.8 G/DL — SIGNIFICANT CHANGE UP (ref 3.3–5)
ALP SERPL-CCNC: 110 U/L — SIGNIFICANT CHANGE UP (ref 40–120)
ALT FLD-CCNC: 21 U/L — SIGNIFICANT CHANGE UP (ref 12–78)
ANION GAP SERPL CALC-SCNC: 6 MMOL/L — SIGNIFICANT CHANGE UP (ref 5–17)
AST SERPL-CCNC: 23 U/L — SIGNIFICANT CHANGE UP (ref 15–37)
BASOPHILS # BLD AUTO: 0.03 K/UL — SIGNIFICANT CHANGE UP (ref 0–0.2)
BASOPHILS NFR BLD AUTO: 0.2 % — SIGNIFICANT CHANGE UP (ref 0–2)
BILIRUB SERPL-MCNC: 0.6 MG/DL — SIGNIFICANT CHANGE UP (ref 0.2–1.2)
BUN SERPL-MCNC: 19 MG/DL — SIGNIFICANT CHANGE UP (ref 7–23)
CALCIUM SERPL-MCNC: 10 MG/DL — SIGNIFICANT CHANGE UP (ref 8.5–10.1)
CHLORIDE SERPL-SCNC: 103 MMOL/L — SIGNIFICANT CHANGE UP (ref 96–108)
CO2 SERPL-SCNC: 28 MMOL/L — SIGNIFICANT CHANGE UP (ref 22–31)
CREAT SERPL-MCNC: 1.01 MG/DL — SIGNIFICANT CHANGE UP (ref 0.5–1.3)
EGFR: 56 ML/MIN/1.73M2 — LOW
EOSINOPHIL # BLD AUTO: 0 K/UL — SIGNIFICANT CHANGE UP (ref 0–0.5)
EOSINOPHIL NFR BLD AUTO: 0 % — SIGNIFICANT CHANGE UP (ref 0–6)
GLUCOSE SERPL-MCNC: 126 MG/DL — HIGH (ref 70–99)
HCT VFR BLD CALC: 41.3 % — SIGNIFICANT CHANGE UP (ref 34.5–45)
HGB BLD-MCNC: 13.3 G/DL — SIGNIFICANT CHANGE UP (ref 11.5–15.5)
IMM GRANULOCYTES NFR BLD AUTO: 0.4 % — SIGNIFICANT CHANGE UP (ref 0–0.9)
LYMPHOCYTES # BLD AUTO: 1.46 K/UL — SIGNIFICANT CHANGE UP (ref 1–3.3)
LYMPHOCYTES # BLD AUTO: 8.2 % — LOW (ref 13–44)
MCHC RBC-ENTMCNC: 30.7 PG — SIGNIFICANT CHANGE UP (ref 27–34)
MCHC RBC-ENTMCNC: 32.2 G/DL — SIGNIFICANT CHANGE UP (ref 32–36)
MCV RBC AUTO: 95.4 FL — SIGNIFICANT CHANGE UP (ref 80–100)
MONOCYTES # BLD AUTO: 1.24 K/UL — HIGH (ref 0–0.9)
MONOCYTES NFR BLD AUTO: 7 % — SIGNIFICANT CHANGE UP (ref 2–14)
NEUTROPHILS # BLD AUTO: 14.9 K/UL — HIGH (ref 1.8–7.4)
NEUTROPHILS NFR BLD AUTO: 84.2 % — HIGH (ref 43–77)
NRBC # BLD: 0 /100 WBCS — SIGNIFICANT CHANGE UP (ref 0–0)
PLATELET # BLD AUTO: 254 K/UL — SIGNIFICANT CHANGE UP (ref 150–400)
POTASSIUM SERPL-MCNC: 4.9 MMOL/L — SIGNIFICANT CHANGE UP (ref 3.5–5.3)
POTASSIUM SERPL-SCNC: 4.9 MMOL/L — SIGNIFICANT CHANGE UP (ref 3.5–5.3)
PROT SERPL-MCNC: 7.6 GM/DL — SIGNIFICANT CHANGE UP (ref 6–8.3)
RBC # BLD: 4.33 M/UL — SIGNIFICANT CHANGE UP (ref 3.8–5.2)
RBC # FLD: 12.7 % — SIGNIFICANT CHANGE UP (ref 10.3–14.5)
SODIUM SERPL-SCNC: 137 MMOL/L — SIGNIFICANT CHANGE UP (ref 135–145)
TROPONIN I, HIGH SENSITIVITY RESULT: 12.3 NG/L — SIGNIFICANT CHANGE UP
WBC # BLD: 17.7 K/UL — HIGH (ref 3.8–10.5)
WBC # FLD AUTO: 17.7 K/UL — HIGH (ref 3.8–10.5)

## 2023-02-24 PROCEDURE — 99284 EMERGENCY DEPT VISIT MOD MDM: CPT

## 2023-02-24 RX ORDER — MEMANTINE HYDROCHLORIDE 10 MG/1
1 TABLET ORAL
Qty: 0 | Refills: 0 | DISCHARGE

## 2023-02-24 RX ORDER — SODIUM CHLORIDE 9 MG/ML
1000 INJECTION INTRAMUSCULAR; INTRAVENOUS; SUBCUTANEOUS ONCE
Refills: 0 | Status: COMPLETED | OUTPATIENT
Start: 2023-02-24 | End: 2023-02-24

## 2023-02-24 RX ADMIN — SODIUM CHLORIDE 1000 MILLILITER(S): 9 INJECTION INTRAMUSCULAR; INTRAVENOUS; SUBCUTANEOUS at 23:29

## 2023-02-24 NOTE — ED ADULT NURSE NOTE - NSFALLRSKHARMRISK_ED_ALL_ED
[facility]    [place] History and Physical    Date of Admission:  2021 10:39 AM    Primary Care Physician   Primary care provider: Carla Perez    Assessment & Plan   Tee Louis is a 37w5d  small for gestational age male  , doing well.   -Normal  care  -Anticipatory guidance given  -Encourage exclusive breastfeeding  -Observe for 48 hours as mom not adequately rx'd     Bryan Cardona    Pregnancy History   The details of the mother's pregnancy are as follows:  OBSTETRIC HISTORY:  Information for the patient's mother:  Justyn Louis Raprestonrosy [[id number]]   22 year old     EDC:   Information for the patient's mother:  Justyn Louis Raeugenio [[id number]]   Estimated Date of Delivery: 21     Information for the patient's mother:  Justyn Louis [[id number]]     OB History    Para Term  AB Living   4 4 4 0 0 4   SAB TAB Ectopic Multiple Live Births   0 0 0 0 4      # Outcome Date GA Lbr Andrews/2nd Weight Sex Delivery Anes PTL Lv   4 Term 06/15/21 37w5d  2.32 kg (5 lb 1.8 oz) M Vag-Spont EPI N NEVAEH      Complications: Preeclampsia/Hypertension      Name: TEE LOUIS      Apgar1: 9  Apgar5: 9   3 Term 20 37w5d 03:28 / 00:17 2.71 kg (5 lb 15.6 oz) M Vag-Spont Nitrous N NEVAEH      Name: Ty      Apgar1: 9  Apgar5: 9   2 Term 03/10/19 38w0d 06:27 / 00:12 2.736 kg (6 lb 0.5 oz) F Vag-Spont EPI N NEVAEH      Name: DEV TINEORL      Apgar1: 9  Apgar5: 9   1 Term 17 39w5d 05:38 / 00:48 2.43 kg (5 lb 5.7 oz) F Vag-Spont EPI N NEVAEH      Name: CIRILO LOUIS GIRL      Apgar1: 8  Apgar5: 9      Obstetric Comments   Hypertension following recent pregancy        Prenatal Labs:   Information for the patient's mother:  Justyn Louis [[id number]]     Lab Results   Component Value Date    ABO O 2021    RH Pos 2021    AS Neg 2021    HEPBANG Nonreactive 2021    CHPCRT Negative  2021    GCPCRT Negative 2021    TREPAB Negative 03/07/2017    HGB 10.2 (L) 2021    HIV Negative 06/26/2013    PATH  2021       Patient Name: ZULEMA LOUIS  MR#: [id number]  Specimen #: [id number]  Collected: 2021  Received: 2021  Reported: 2021 10:26  Ordering Phy(s): CORNELIO POE    For improved result formatting, select 'View Enhanced Report Format' under   Linked Documents section.    SPECIMEN/STAIN PROCESS:  Pap imaged thin layer prep screening (Surepath, FocalPoint with guided   screening)       Pap-Cyto x 1    SOURCE: Cervical, endocervical  ----------------------------------------------------------------   Pap imaged thin layer prep screening (Surepath, FocalPoint with guided   screening)  SPECIMEN ADEQUACY:  Satisfactory for evaluation.  -Transformation zone component present.    CYTOLOGIC INTERPRETATION:    Negative for intraepithelial lesion or malignancy    Electronically signed out by:  DAVIDSON Parra (ASCP)    CLINICAL HISTORY:    Pregnant,    Papanicolaou Test Limitations:  Cervical cytology is a screening test with   limited sensitivity; regular  screening is critical for cancer prevention; Pap tests are primarily   effective for the diagnosis/prevention of  squamous cell carcinoma, not adenocarcinomas or other cancers.    The technical component of this testing was completed at the [facility], with the professional component performed   at the [facility], [address] [place] [phone] ([phone])    COLLECTION SITE:  Client:  [facility]  Location: [place] ([facility])            Prenatal Ultrasound:  Information for the patient's mother:  Zulema Louis [[id number]]     Results for orders placed or performed during the hospital encounter of 02/25/21   [facility] US Comprehensive Single  F/U    Narrative            Comp Follow Up  ---------------------------------------------------------------------------------------------------------  Pat. Name: ZULEMA LOUIS       Study Date:  2021 11:07am  Pat. NO:  [id number]        Referring  MD: CORNELIO POE  Site:  [facility]       Sonographer: Colleen Lopez RDMS   :  1997        Age:   23  ---------------------------------------------------------------------------------------------------------    INDICATION  ---------------------------------------------------------------------------------------------------------  Follow up suboptimal anatomy.      METHOD  ---------------------------------------------------------------------------------------------------------  Transabdominal ultrasound examination. View: Sufficient      PREGNANCY  ---------------------------------------------------------------------------------------------------------  Márquez pregnancy. Number of fetuses: 1      DATING  ---------------------------------------------------------------------------------------------------------                                           Date                                Details                                                                                      Gest. age                      EZRA  Prior assessment               2020                       GA: 8 w + 4 d                                                                             22 w + 0 d                     2021  U/S                                   2021                         based upon AC, BPD, Femur, HC                                                22 w + 0 d                     2021  Assigned dating                  Dating performed on 2021, based on the prior assessment (on 2020)                     22 w + 0 d                     2021      GENERAL  EVALUATION  ---------------------------------------------------------------------------------------------------------  Cardiac activity present.  bpm.  Fetal movements present.  Presentation cephalic.  Placenta Posterior, There is no evidence of placenta previa.  Umbilical cord 3 vessel cord.  Amniotic fluid normal MVP, MVP 5.9 cm.      FETAL BIOMETRY  ---------------------------------------------------------------------------------------------------------  Main Fetal Biometry:  BPD                                        52.1                    mm                         21w 6d                Hadlock  OFD                                        72.3                    mm                         22w 2d                Nicolaides  HC                                          199.5                  mm                          22w 1d                Hadlock  Cerebellum tr                            22.8                   mm                          21w 3d                Nicolaides  AC                                          164.2                  mm                          21w 3d        26%        Hadlock  Femur                                      38.5                   mm                          22w 2d                Hadlock  Fetal Weight Calculation:  EFW                                       459                     g                                     38%        Hadlock  EFW (lb,oz)                             1 lb 0                  oz  EFW by                                        Hadlock (BPD-HC-AC-FL)  Head / Face / Neck Biometry:                                             6.3                     mm  CM                                          6.3                     mm      FETAL ANATOMY  ---------------------------------------------------------------------------------------------------------  The following structures appear normal:  Head / Neck                         Cranium. Head size. Head shape.  Lateral ventricles. Midline falx. Cavum septi pellucidi. Cerebellum. Cisterna magna. Thalami.  Face                                   Lips. Profile. Nose.  Heart / Thorax                      4-chamber view. RVOT view. LVOT view. 3-vessel-trachea view.                                             Diaphragm.  Abdomen                             Stomach. Kidneys. Bladder.  Spine                                  Cervical spine. Thoracic spine. Lumbar spine. Sacral spine.      MATERNAL STRUCTURES  ---------------------------------------------------------------------------------------------------------  Cervix                                  Suboptimal  Right Ovary                          Not examined  Left Ovary                            Not examined      RECOMMENDATION  ---------------------------------------------------------------------------------------------------------  We discussed the findings on today's ultrasound with the patient.    Further ultrasound studies as clinically indicated.    Return to primary provider for continued prenatal care.    Thank you for the opportunity to participate in the care of this patient. If you have questions regarding today's evaluation or if we can be of further service, please contact the  [facility].    **Fetal anomalies may be present but not detected**        Impression    IMPRESSION  ---------------------------------------------------------------------------------------------------------  1) Intrauterine pregnancy at 22 0/7 weeks gestational age.  2) The anatomy not previously seen is visualized today and appears normal. The remainder of the visualized fetal anatomy appears normal.  3) Growth parameters and estimated fetal weight were consistent with an appropriate for gestation age pattern of growth.  4) The amniotic fluid volume appeared normal.            GBS Status:   Information for the patient's mother:  Justyn Todd [[id number]]  "    Lab Results   Component Value Date    GBS Negative 2020      Positive not adequately treated    Maternal History    Maternal past medical history, problem list and prior to admission medications reviewed and notable for history of HSV on prophylaxis since 36 weeks    Medications given to Mother since admit:  reviewed     Family History - [place]   Information for the patient's mother:  Justyn Todd Raeugenio [[id number]]     Family History   Problem Relation Age of Onset     Psychotic Disorder Mother      Bipolar Disorder Mother      Asthma Father      Allergies Father      Asthma Sister      Hypertension Other         grandparents     Allergies Sister      Cancer Other         grandfather - esophagus cancer     Eye Disorder Other         grandparents     C.A.D. Maternal Grandfather         passed away 11          Social History -    Information for the patient's mother:  Perez Justyn Cai [[id number]]     Social History     Tobacco Use     Smoking status: Current Every Day Smoker     Packs/day: 0.25     Years: 1.00     Pack years: 0.25     Types: Cigarettes     Start date: 2016     Smokeless tobacco: Former User     Quit date: 2019   Substance Use Topics     Alcohol use: No          Birth History   Infant Resuscitation Needed: no     Birth Information  Birth History     Birth     Length: 44.5 cm (1' 5.5\")     Weight: 2.32 kg (5 lb 1.8 oz)     HC 31.8 cm (12.5\")     Apgar     One: 9.0     Five: 9.0     Delivery Method: Vaginal, Spontaneous     Gestation Age: 37 5/7 wks       Resuscitation and Interventions:   Oral/Nasal/Pharyngeal Suction at the Perineum:      Method:  None    Oxygen Type:       Intubation Time:   # of Attempts:       ETT Size:      Tracheal Suction:       Tracheal returns:      Brief Resuscitation Note:  NNP Delivery Note    Asked by ADAN Ruiz to attend the delivery of this term, male infant with a gestational age of 37 5/7 weeks secondary to " "meconium.      Infant delivered at 10:39 hours on 2021. Infant had spontaneous respirations at   birth. He was placed on mom's chest. Good tone, color and cry at birth. Infant is well appearing and team left without intervention.     ADAN Brown, CNP 2021 10:51 AM   Advanced Practice Providers  [facility]               Immunization History   There is no immunization history for the selected administration types on file for this patient.     Physical Exam   Vital Signs:  Patient Vitals for the past 24 hrs:   Temp Temp src Pulse Resp Height Weight   21 0900 99.1  F (37.3  C) Axillary 140 52 -- --   06/15/21 2000 99.6  F (37.6  C) Axillary 128 54 -- --   06/15/21 1340 98.6  F (37  C) Axillary 144 52 -- --   06/15/21 1230 98  F (36.7  C) Axillary 135 60 -- --   06/15/21 1200 98.2  F (36.8  C) Axillary 150 48 -- --   06/15/21 1120 97.6  F (36.4  C) Axillary 130 48 -- --   06/15/21 1050 98.5  F (36.9  C) Axillary 160 54 -- --   06/15/21 1039 -- -- -- -- 0.445 m (1' 5.5\") 2.32 kg (5 lb 1.8 oz)     [place] Measurements:  Weight: 5 lb 1.8 oz (2320 g)    Length: 17.5\"    Head circumference: 31.8 cm      General:  alert and normally responsive  Skin:  no abnormal markings; normal color without significant rash.  No jaundice  Head/Neck:  normal anterior and posterior fontanelle, intact scalp; Neck without masses  Eyes:  normal red reflex, clear conjunctiva  Ears/Nose/Mouth:  intact canals, patent nares, mouth normal  Thorax:  normal contour, clavicles intact  Lungs:  clear, no retractions, no increased work of breathing  Heart:  normal rate, rhythm.  No murmurs.  Normal femoral pulses.  Abdomen:  soft without mass, tenderness, organomegaly, hernia.  Umbilicus normal.  Genitalia:  normal male external genitalia with testes descended bilaterally  Anus:  patent  Trunk/spine:  straight, intact  Muskuloskeletal:  Normal Barajas and Ortolani maneuvers.  " intact without deformity.  Normal digits.  Neurologic:  normal, symmetric tone and strength.  normal reflexes.    Data    All laboratory data reviewed   no

## 2023-02-24 NOTE — ED ADULT TRIAGE NOTE - CHIEF COMPLAINT QUOTE
Pt walked in the ED, PMH of dementia, HTN, HLD, unsteady gait.  C/o dehydration, constipation, poor appetite from today. As per daughter pt has not been eating today. LBM unknown. Pt was given enema today ~6pm, but no BM.   Pt denied abd pain, cp, sob, n/v/d, fall/trauma/injury.

## 2023-02-24 NOTE — ED ADULT NURSE NOTE - OBJECTIVE STATEMENT
Pt received in bed M37, 81 y/o F, A&Ox2, brought in by family for dehydration, constipation, poor appetite from today. PMH of dementia, HTN, HLD, unsteady gait. As per daughter pt has not been eating today. LBM unknown. Pt was given enema today ~6pm, but no BM. Pt denied abd pain, cp, sob, n/v/d, fever, cough, chills, fall/trauma/injury. Pt resting in bed, appears in NAD, respirations even and unlabored.

## 2023-02-24 NOTE — ED ADULT TRIAGE NOTE - NS ED TRIAGE AVPU SCALE
Attempted to contact patient for care coordination follow up. Unable to reach patient by phone. Message left regarding purpose of call. Number provided and call back requested. Alert-The patient is alert, awake and responds to voice. The patient is oriented to time, place, and person. The triage nurse is able to obtain subjective information.

## 2023-02-24 NOTE — ED ADULT NURSE NOTE - NSIMPLEMENTINTERV_GEN_ALL_ED
Implemented All Fall Risk Interventions:  [place] to call system. Call bell, personal items and telephone within reach. Instruct patient to call for assistance. Room bathroom lighting operational. Non-slip footwear when patient is off stretcher. Physically safe environment: no spills, clutter or unnecessary equipment. Stretcher in lowest position, wheels locked, appropriate side rails in place. Provide visual cue, wrist band, yellow gown, etc. Monitor gait and stability. Monitor for mental status changes and reorient to person, place, and time. Review medications for side effects contributing to fall risk. Reinforce activity limits and safety measures with patient and family.

## 2023-02-25 VITALS
SYSTOLIC BLOOD PRESSURE: 124 MMHG | TEMPERATURE: 98 F | OXYGEN SATURATION: 99 % | RESPIRATION RATE: 18 BRPM | DIASTOLIC BLOOD PRESSURE: 73 MMHG | HEART RATE: 89 BPM

## 2023-02-25 LAB
APPEARANCE UR: CLEAR — SIGNIFICANT CHANGE UP
BACTERIA # UR AUTO: ABNORMAL
BILIRUB UR-MCNC: NEGATIVE — SIGNIFICANT CHANGE UP
COLOR SPEC: YELLOW — SIGNIFICANT CHANGE UP
DIFF PNL FLD: ABNORMAL
EPI CELLS # UR: SIGNIFICANT CHANGE UP
GLUCOSE UR QL: NEGATIVE MG/DL — SIGNIFICANT CHANGE UP
KETONES UR-MCNC: ABNORMAL
LEUKOCYTE ESTERASE UR-ACNC: ABNORMAL
NITRITE UR-MCNC: NEGATIVE — SIGNIFICANT CHANGE UP
PH UR: 6 — SIGNIFICANT CHANGE UP (ref 5–8)
PROT UR-MCNC: 15 MG/DL
RAPID RVP RESULT: SIGNIFICANT CHANGE UP
RBC CASTS # UR COMP ASSIST: SIGNIFICANT CHANGE UP /HPF (ref 0–4)
SARS-COV-2 RNA SPEC QL NAA+PROBE: SIGNIFICANT CHANGE UP
SP GR SPEC: 1 — LOW (ref 1.01–1.02)
UROBILINOGEN FLD QL: NEGATIVE MG/DL — SIGNIFICANT CHANGE UP
WBC UR QL: SIGNIFICANT CHANGE UP

## 2023-02-25 PROCEDURE — 93010 ELECTROCARDIOGRAM REPORT: CPT

## 2023-02-25 PROCEDURE — 74177 CT ABD & PELVIS W/CONTRAST: CPT | Mod: 26,MA

## 2023-02-25 RX ORDER — SENNA PLUS 8.6 MG/1
2 TABLET ORAL
Qty: 6 | Refills: 0
Start: 2023-02-25 | End: 2023-02-27

## 2023-02-25 RX ORDER — DOCUSATE SODIUM 100 MG
1 CAPSULE ORAL
Qty: 10 | Refills: 0
Start: 2023-02-25 | End: 2023-03-01

## 2023-02-25 RX ORDER — ACETAMINOPHEN 500 MG
2 TABLET ORAL
Qty: 40 | Refills: 0
Start: 2023-02-25 | End: 2023-03-01

## 2023-02-25 RX ADMIN — Medication 10 MILLIGRAM(S): at 07:38

## 2023-02-25 NOTE — ED PROVIDER NOTE - OBJECTIVE STATEMENT
81 yo F with "dehydration and constipation."  Because of her dementia, pt. doesn't eat well and doesn't have good bowel movements.  Pt. is reportedly at baseline mental status.  History from daughter as pt. is confused.  No recent trauma/inciting event.  Pt. is otherwise at baseline.    ROS: negative for fever, cough, headache, chest pain, shortness of breath, abd pain, nausea, vomiting, diarrhea, rash, paresthesia, and weakness--all other systems reviewed are negative.   PMH: htn, hld lewy body dementia; Meds: See EMR for list; SH: Denies smoking/drinking/drug use

## 2023-02-25 NOTE — ED PROVIDER NOTE - PATIENT PORTAL LINK FT
You can access the FollowMyHealth Patient Portal offered by [facility] by registering at the following website: [email or web address]. By joining [email or web address]’s FollowMyHealth portal, you will also be able to view your health information using other applications (apps) compatible with our system.

## 2023-02-25 NOTE — ED PROVIDER NOTE - PROGRESS NOTE DETAILS
Results reported to patient/family--ua shows uti, CT shows constipation, wbc elevation likely 2/2 uti  Pt. reports feeling well, still confused, but comfortable/pleasant   pt. agrees to f/u with primary care outpt. asap  pt. understands to return to ED if symptoms worsen; will d/c with meds, copy fo results for review

## 2023-02-25 NOTE — ED PROVIDER NOTE - CARE PROVIDER_API CALL
Sundar Vance E  INTERNAL MEDICINE  [address] [place] [address]  Phone: [phone]  Fax: [phone]  Follow Up Time: Urgent

## 2023-02-25 NOTE — ED PROVIDER NOTE - CLINICAL SUMMARY MEDICAL DECISION MAKING FREE TEXT BOX
83 yo F with "constipation and dehydration," baseline dementia, concerning for uti, viral syndrome, doubt acs  -basic labs, ua, cx, rvp, trop, ekg, iv, monitor, rectal temp, hydration bolus  -f/u results, reeval

## 2023-02-25 NOTE — ED PROVIDER NOTE - PHYSICAL EXAMINATION
Vitals: tachy at 110  Gen: AAOx1, pleasantly confused, NAD, sitting comfortably in stretcher  Head: ncat, perrla, eomi b/l  Neck: supple, no lymphadenopathy, no midline deviation  Heart: rrr, no m/r/g  Lungs: CTA b/l, no rales/ronchi/wheezes  Abd: soft, nontender, non-distended, no rebound or guarding  Ext: no clubbing/cyanosis/edema  Neuro: sensation and muscle strength intact b/l, steady gait

## 2023-02-26 LAB
CULTURE RESULTS: SIGNIFICANT CHANGE UP
SPECIMEN SOURCE: SIGNIFICANT CHANGE UP

## 2023-02-28 ENCOUNTER — NON-APPOINTMENT (OUTPATIENT)
Age: 83
End: 2023-02-28

## 2023-03-06 ENCOUNTER — RX RENEWAL (OUTPATIENT)
Age: 83
End: 2023-03-06

## 2023-03-08 ENCOUNTER — NON-APPOINTMENT (OUTPATIENT)
Age: 83
End: 2023-03-08

## 2023-03-10 ENCOUNTER — APPOINTMENT (OUTPATIENT)
Dept: NEUROLOGY | Facility: CLINIC | Age: 83
End: 2023-03-10

## 2023-03-10 ENCOUNTER — APPOINTMENT (OUTPATIENT)
Dept: NEUROLOGY | Facility: CLINIC | Age: 83
End: 2023-03-10
Payer: MEDICARE

## 2023-03-10 PROCEDURE — 99215 OFFICE O/P EST HI 40 MIN: CPT | Mod: 95

## 2023-03-10 NOTE — HISTORY OF PRESENT ILLNESS
[FreeTextEntry1] : verbal consent given on 03/10/2023 and 16:01  by EYAL REDD at [address] [facility]\[place] [address]\par \par 82 W who is here for followup of her lewy Body's dementia. She has been going to PT and gait has increased in stability. She had some hallucination of books near her table. We discussed not giving any antipsychotics as pt is well aware of the hallucinations. Marilee was concerned b/c there are certain times of day when she's alone. She wanted to try olanzapine. AFter discussion, Pt was agreeable to trying it. Mairlee also states that she sometimes puts her clothes on inside out. She wishes to try occupational therapy.\par \par Interval history: The olanzapine has not done anything to help her sleeping issues.  It appears she takes naps and she does not sleep at night however she sleeps in until the early afternoon.  Marilee had recommended writing specifics on the Occupational Therapy.\par \par Interval history: Patient continues to have unsteadiness that is worse intermittently.  Patient states that when she does have her lightheaded spells her heart is beating very fast.  Patient's blood pressure is125/78 with a pulse of 75.  Patient seems to have a glazed over look whenever she has these episodes\par Patient has not started physical therapy or occupational therapy.\par \par Interval history: Patient is wearing the Holter monitor to evaluate for any signs of arrhythmia.  Patient had a EEG which was unremarkable.  Patient's blood pressure seems to be in the 120s over 70s when family checks it at home.  Patient has upcoming neuropsych evaluation in July.  Patient benefited from the occupational therapy and physical therapy and would like to continue the treatment.  \par \par Interval history: Patient's daughter states that the occupational and physical therapist states that they no longer want to continue her sessions despite new prescription from me.  Patient is noted to have falls where she would stop while she is walking and then turn her head to the left.  Patient would not respond for 2 to 3 minutes during these episodes.  Patient's daughter is in the process of getting more help as she has only 4 hours of aide.  Patient tends to fall and needs help with activities of daily living.  Patient needs help with dressing and with task transitions.  Patient's daughter is her only caretaker and she works full-time and on the weekends she cares for her mother.  Patient's daughter is experiencing burnout.  Patient is also noted to have anxiety and worries where there is decreased social interactions even when she is at any adult day programs.  Patient was asked and she denied any crying or signs of depression.\par \par Interval history November 29, 2022: Since her last visit patient had a normal EEG and has no further pedal edema.  Patient has increased frequency of falls where she would activate the ADT monitoring system.  Patient is not sleeping at night for she has her days and nights reversed.  Patient is in the process of getting a lift for her staircase as she lives in the top floor and it is difficult for her to even stand from a seated position.  Patient needs help with dressing and activities of daily living.  Patient definitely needs a night time aide. Her hallucinations are worsening. \par \par Interval history 3/10/23: She is falling more. She fell on 3/9 and her daughter caught her. She also had recent episode of constipation. \par \par CONSENT FOR VIDEO MEDICAL VISIT1. I understand that my physician wishes me to engage in a telemedicine consultation.2. My physician has explained to me how the video conferencing technology will be used to affect such a consultation will not be the same as a direct patient/health care provider visit due to the fact that I will not be in the same room as my physician 3. I understand there are potential risks to this technology, including interruptions, unauthorized access and technical difficulties. I understand that my health care provider or I can discontinue the telemedicine consult/visit if it is felt that the videoconferencing connections are not adequate for the situation.4. I understand that my healthcare information may be shared with other individuals for scheduling and billing purposes. Others may also be present during the consultation other than my physician and consulting health care provider in order to operate the video equipment. The above mentioned people will all maintain confidentiality of the information obtained. I further understand that I will be informed of their presence in the consultation and thus will have the right to request the following: (1) omit specific details of my medical history/physical examination that are personally sensitive to me; (2) ask non-medical personnel to leave the telemedicine examination room: and or (3) terminate the consultation at any time.5. I have had the alternatives to a telemedicine consultation explained to me, and in choosing to participate in a telemedicine consultation. I understand that some parts of the exam involving physical tests may be conducted by individuals at my location at the direction of the consulting health care provider.6. In an emergent consultation, I understand that the responsibility of the telemedicine consulting specialist is to advise my local practitioner and that the specialist’s responsibility will conclude upon the termination of the video conference connection.7. I understand that billing will occur from both my physician and as a facility fee from the site from which I am presented.8. I have had a direct conversation with my physician, during which I had the opportunity to ask questions in regard to this procedure. My questions have been answered and the risks, benefits and any practical alternatives have been discussed with me in a language in which I understand\par \par

## 2023-03-10 NOTE — PHYSICAL EXAM
[Person] : disoriented to person [Place] : disoriented to place [Time] : disoriented to time [Paresis Pronator Drift Right-Sided] : no pronator drift on the right [Paresis Pronator Drift Left-Sided] : no pronator drift on the left [1+] : Pat[place] left 1+ [FreeTextEntry4] : video froze

## 2023-03-10 NOTE — DISCUSSION/SUMMARY
[FreeTextEntry1] : 82-year-old woman who is here for follow-up of her Lewy bodies dementia which has been progressing in terms of worsening hallucinations and her falls. Patient will continue physical therapy for gait and balance training and to combat deconditioning for at home therapy. WIll also help with OT. Patient, her daughter and I had a lengthy discussion regarding safety for the patient in terms of more hours for the aide etc. Daughter was given the phone number for [facility] to find a geriatric psychiatrist as sometimes she will think that her daughter and the aide are talking about her maliciously when in fact they are talking about her care. Will write letter to support her increased hours. \par \par \par physician location: office\par patient location: home\par \par I spent 40 minutes of total time, during which more than 50% of the time was spent on counseling. I explained the diagnosis, other possible diagnoses, workup, and management, as well as answered any questions.\par \par This is a telemedicine visit that was performed using real time 2-way audiovisual technology. Verbal consent to participate in video visit was obtained and patient was aware of their right to refuse to participate in services delivered via telemedicine and the alternative and potential limitations of participating in a telemedicine visit vs a face-to-face visit; I have also informed the patient of my current location in the [facility] of [place] and the names of all persons participating in the telemedicine service and their role in the encounter. The patient agrees to have this service via Telehealth.\par \par  This visit occurred during the Coronavirus (COVID-19) Public Health Emergency. I discussed with the patient the nature of our telemedicine visits, that: \par • I would evaluate the patient and recommend diagnostics and treatments based on my assessment \par • Our sessions are not being recorded and that personal health information is protected \par • Our team would provide follow up care in person if/when the patient needs it.\par

## 2023-06-04 NOTE — ED ADULT NURSE NOTE - BREATHING, MLM
Problem: At Risk for Falls  Goal: # Patient does not fall  Outcome: Outcome Met, Continue evaluating goal progress toward completion  Goal: # Takes action to control fall-related risks  Outcome: Outcome Met, Continue evaluating goal progress toward completion  Goal: # Verbalizes understanding of fall risk/precautions  Description: Document education using the patient education activity  Outcome: Outcome Met, Continue evaluating goal progress toward completion     Problem: Non-Pressure Injury Wound  Goal: # No deterioration in wound  Outcome: Outcome Met, Continue evaluating goal progress toward completion  Goal: # Verbalizes understanding of wound and wound care  Description: If abnormality is a skin tear, avoid using tape on skin including transparent dressings. Document education using the patient education activity.  Outcome: Outcome Met, Continue evaluating goal progress toward completion  Goal: Participates in wound care activities  Outcome: Outcome Met, Continue evaluating goal progress toward completion      Spontaneous, unlabored and symmetrical

## 2023-07-11 ENCOUNTER — APPOINTMENT (OUTPATIENT)
Dept: CARDIOLOGY | Facility: CLINIC | Age: 83
End: 2023-07-11

## 2023-07-17 ENCOUNTER — APPOINTMENT (OUTPATIENT)
Dept: NEUROLOGY | Facility: CLINIC | Age: 83
End: 2023-07-17

## 2023-08-07 ENCOUNTER — APPOINTMENT (OUTPATIENT)
Dept: NEUROLOGY | Facility: CLINIC | Age: 83
End: 2023-08-07

## 2023-08-08 ENCOUNTER — NON-APPOINTMENT (OUTPATIENT)
Age: 83
End: 2023-08-08

## 2023-08-08 ENCOUNTER — APPOINTMENT (OUTPATIENT)
Dept: CARDIOLOGY | Facility: CLINIC | Age: 83
End: 2023-08-08
Payer: MEDICARE

## 2023-08-08 VITALS
HEART RATE: 84 BPM | SYSTOLIC BLOOD PRESSURE: 110 MMHG | DIASTOLIC BLOOD PRESSURE: 78 MMHG | HEIGHT: 67 IN | OXYGEN SATURATION: 90 %

## 2023-08-08 PROCEDURE — 99213 OFFICE O/P EST LOW 20 MIN: CPT

## 2023-08-08 PROCEDURE — 93000 ELECTROCARDIOGRAM COMPLETE: CPT

## 2023-08-08 RX ORDER — AMLODIPINE BESYLATE 5 MG/1
5 TABLET ORAL
Qty: 90 | Refills: 2 | Status: DISCONTINUED | COMMUNITY
Start: 2023-03-06 | End: 2023-08-08

## 2023-08-08 RX ORDER — AMLODIPINE BESYLATE 2.5 MG/1
2.5 TABLET ORAL DAILY
Qty: 90 | Refills: 3 | Status: DISCONTINUED | COMMUNITY
Start: 2021-06-09 | End: 2023-08-08

## 2023-08-08 NOTE — HISTORY OF PRESENT ILLNESS
[FreeTextEntry1] : Pleasant 82-year-old female with dyslipidemia, hypertension, TIA, paroxysmal atrial tachycardia and Lewy body dementia follows up in the office.  No active chest complaints.  She has home health care services as well.  Here with her daughter.  At times her blood pressure has been marginal in the low.  No lightheadedness noted.

## 2023-08-08 NOTE — DISCUSSION/SUMMARY
[___ Month(s)] : in [unfilled] month(s) [FreeTextEntry1] : Continue aspirin and simvastatin for lipid lowering and cardiovascular risk reduction along with supportive care measures.  She was previously on Norvasc which at this point we will stop.  Follow home blood pressure trends and perhaps even liberalize some salt in her diet if necessary.  Heart healthy diet and lifestyle encouraged.  No cardiac testing indicated gated at present.  Follow-up with you for care see us in about a years time or sooner if needed.

## 2023-08-10 ENCOUNTER — APPOINTMENT (OUTPATIENT)
Dept: NEUROLOGY | Facility: CLINIC | Age: 83
End: 2023-08-10
Payer: MEDICARE

## 2023-08-10 ENCOUNTER — NON-APPOINTMENT (OUTPATIENT)
Age: 83
End: 2023-08-10

## 2023-08-10 PROCEDURE — 99215 OFFICE O/P EST HI 40 MIN: CPT | Mod: 95

## 2023-08-10 NOTE — DISCUSSION/SUMMARY
[FreeTextEntry1] : 82-year-old woman with advanced Lewy bodies dementia who continues to progress is here for follow-up.  To minimize aspiration pneumonia we will have her obtain speech and swallow hopefully at home given mobility issues.  Will reorder the geriatric psychiatrist referral as patient had delusional thought whenever daughter was talking to anybody in the room.  For future reference will have telemedicine with the caregiver and call the daughter during the visits as it is harder for the daughter to get to her after hours when school starts.  We also talked about caregiver burnout and I have encouraged the daughter to seek therapy as this is a difficult time.  physician location: office patient location: home  I spent 40 minutes of total time, during which more than 50% of the time was spent on counseling. I explained the diagnosis, other possible diagnoses, workup, and management, as well as answered any questions.  This is a telemedicine visit that was performed using real time 2-way audiovisual technology. Verbal consent to participate in video visit was obtained and patient was aware of their right to refuse to participate in services delivered via telemedicine and the alternative and potential limitations of participating in a telemedicine visit vs a face-to-face visit; I have also informed the patient of my current location in the [facility] and the names of all persons participating in the telemedicine service and their role in the encounter. The patient agrees to have this service via Telehealth.   This visit occurred during the Coronavirus (COVID-19) Public Health Emergency. I discussed with the patient the nature of our telemedicine visits, that:   I would evaluate the patient and recommend diagnostics and treatments based on my assessment   Our sessions are not being recorded and that personal health information is protected   Our team would provide follow up care in person if/when the patient needs it.

## 2023-08-10 NOTE — HISTORY OF PRESENT ILLNESS
[FreeTextEntry1] : verbal consent given on 08/10/2023 and 11:23  by EYAL REDD at [address] [place] [id number]  Interval history August 10, 2023: Daughter is frustrated by the fact that sometimes our connections are not going through.  We came up with a new plan of the daughter will be on the phone and then telemedicine will be held with the caregiver so that this will work when daughter goes back to school in the September.  Patient is declining with her physical mobility even with physical therapy.  She is not opening her mouth to get food.  Patient is usually tilted to one side at baseline.  Daughter was inquiring about a neck brace. WIll refer to speech and swallow for recommendations of ways to minimize aspiration pneumonia. She was advised that it would not be a good idea to continue the neck brace chronically as it may lead to more deconditioning of her neck muscles. As per daughter physical therapy is getting her on a customized wheelchair. Patient has not yet seen the geriatric psychiatrist and referral will be given again.  CONSENT FOR VIDEO MEDICAL VISIT1. I understand that my physician wishes me to engage in a telemedicine consultation.2. My physician has explained to me how the video conferencing technology will be used to affect such a consultation will not be the same as a direct patient/health care provider visit due to the fact that I will not be in the same room as my physician 3. I understand there are potential risks to this technology, including interruptions, unauthorized access and technical difficulties. I understand that my health care provider or I can discontinue the telemedicine consult/visit if it is felt that the videoconferencing connections are not adequate for the situation.4. I understand that my healthcare information may be shared with other individuals for scheduling and billing purposes. Others may also be present during the consultation other than my physician and consulting health care provider in order to operate the video equipment. The above mentioned people will all maintain confidentiality of the information obtained. I further understand that I will be informed of their presence in the consultation and thus will have the right to request the following: (1) omit specific details of my medical history/physical examination that are personally sensitive to me; (2) ask non-medical personnel to leave the telemedicine examination room: and or (3) terminate the consultation at any time.5. I have had the alternatives to a telemedicine consultation explained to me, and in choosing to participate in a telemedicine consultation. I understand that some parts of the exam involving physical tests may be conducted by individuals at my location at the direction of the consulting health care provider.6. In an emergent consultation, I understand that the responsibility of the telemedicine consulting specialist is to advise my local practitioner and that the specialists responsibility will conclude upon the termination of the video conference connection.7. I understand that billing will occur from both my physician and as a facility fee from the site from which I am presented.8. I have had a direct conversation with my physician, during which I had the opportunity to ask questions in regard to this procedure. My questions have been answered and the risks, benefits and any practical alternatives have been discussed with me in a language in which I understand

## 2023-09-06 ENCOUNTER — RX RENEWAL (OUTPATIENT)
Age: 83
End: 2023-09-06

## 2023-09-06 RX ORDER — MEMANTINE HYDROCHLORIDE 10 MG/1
10 TABLET, FILM COATED ORAL
Qty: 180 | Refills: 0 | Status: ACTIVE | COMMUNITY
Start: 2019-06-18 | End: 1900-01-01

## 2023-11-20 ENCOUNTER — APPOINTMENT (OUTPATIENT)
Dept: INTERNAL MEDICINE | Facility: CLINIC | Age: 83
End: 2023-11-20
Payer: MEDICARE

## 2023-11-20 VITALS
OXYGEN SATURATION: 100 % | WEIGHT: 138 LBS | HEART RATE: 83 BPM | SYSTOLIC BLOOD PRESSURE: 115 MMHG | DIASTOLIC BLOOD PRESSURE: 70 MMHG | BODY MASS INDEX: 21.61 KG/M2

## 2023-11-20 DIAGNOSIS — G45.1 CAROTID ARTERY SYNDROME (HEMISPHERIC): ICD-10-CM

## 2023-11-20 DIAGNOSIS — E78.00 PURE HYPERCHOLESTEROLEMIA, UNSPECIFIED: ICD-10-CM

## 2023-11-20 DIAGNOSIS — Z23 ENCOUNTER FOR IMMUNIZATION: ICD-10-CM

## 2023-11-20 DIAGNOSIS — N39.0 URINARY TRACT INFECTION, SITE NOT SPECIFIED: ICD-10-CM

## 2023-11-20 DIAGNOSIS — R58 HEMORRHAGE, NOT ELSEWHERE CLASSIFIED: ICD-10-CM

## 2023-11-20 PROCEDURE — 99214 OFFICE O/P EST MOD 30 MIN: CPT | Mod: 25

## 2023-11-20 PROCEDURE — 90662 IIV NO PRSV INCREASED AG IM: CPT

## 2023-11-20 PROCEDURE — G0008: CPT

## 2023-11-20 PROCEDURE — 36415 COLL VENOUS BLD VENIPUNCTURE: CPT

## 2023-11-20 PROCEDURE — 99204 OFFICE O/P NEW MOD 45 MIN: CPT | Mod: 25

## 2023-11-20 RX ORDER — DRONABINOL 2.5 MG/1
2.5 CAPSULE ORAL
Qty: 60 | Refills: 0 | Status: DISCONTINUED | COMMUNITY
Start: 2022-12-30 | End: 2023-11-20

## 2023-11-20 RX ORDER — NITROFURANTOIN MACROCRYSTALS 100 MG/1
100 CAPSULE ORAL
Qty: 10 | Refills: 0 | Status: DISCONTINUED | COMMUNITY
Start: 2023-02-22 | End: 2023-11-20

## 2023-11-21 LAB
ALBUMIN SERPL ELPH-MCNC: 4.3 G/DL
ALP BLD-CCNC: 85 U/L
ALT SERPL-CCNC: 11 U/L
ANION GAP SERPL CALC-SCNC: 13 MMOL/L
AST SERPL-CCNC: 19 U/L
BASOPHILS # BLD AUTO: 0.04 K/UL
BASOPHILS NFR BLD AUTO: 0.4 %
BILIRUB SERPL-MCNC: 0.3 MG/DL
BUN SERPL-MCNC: 18 MG/DL
CALCIUM SERPL-MCNC: 9.9 MG/DL
CHLORIDE SERPL-SCNC: 104 MMOL/L
CHOLEST SERPL-MCNC: 174 MG/DL
CO2 SERPL-SCNC: 20 MMOL/L
CREAT SERPL-MCNC: 1.29 MG/DL
EGFR: 42 ML/MIN/1.73M2
EOSINOPHIL # BLD AUTO: 0.12 K/UL
EOSINOPHIL NFR BLD AUTO: 1.1 %
FERRITIN SERPL-MCNC: 97 NG/ML
FOLATE SERPL-MCNC: 17.7 NG/ML
GLUCOSE SERPL-MCNC: 94 MG/DL
HCT VFR BLD CALC: 44.8 %
HDLC SERPL-MCNC: 61 MG/DL
HGB BLD-MCNC: 14.2 G/DL
IMM GRANULOCYTES NFR BLD AUTO: 0.4 %
IRON SATN MFR SERPL: 25 %
IRON SERPL-MCNC: 64 UG/DL
LDLC SERPL CALC-MCNC: 96 MG/DL
LYMPHOCYTES # BLD AUTO: 3.42 K/UL
LYMPHOCYTES NFR BLD AUTO: 30 %
MAN DIFF?: NORMAL
MCHC RBC-ENTMCNC: 31.4 PG
MCHC RBC-ENTMCNC: 31.7 GM/DL
MCV RBC AUTO: 99.1 FL
MONOCYTES # BLD AUTO: 0.75 K/UL
MONOCYTES NFR BLD AUTO: 6.6 %
NEUTROPHILS # BLD AUTO: 7.02 K/UL
NEUTROPHILS NFR BLD AUTO: 61.5 %
NONHDLC SERPL-MCNC: 113 MG/DL
PLATELET # BLD AUTO: 273 K/UL
POTASSIUM SERPL-SCNC: 4.8 MMOL/L
PROT SERPL-MCNC: 6.9 G/DL
RBC # BLD: 4.52 M/UL
RBC # FLD: 13.4 %
SODIUM SERPL-SCNC: 137 MMOL/L
T4 FREE SERPL-MCNC: 1.6 NG/DL
TIBC SERPL-MCNC: 258 UG/DL
TRANSFERRIN SERPL-MCNC: 197 MG/DL
TRIGL SERPL-MCNC: 91 MG/DL
TSH SERPL-ACNC: 1.52 UIU/ML
UIBC SERPL-MCNC: 193 UG/DL
VIT B12 SERPL-MCNC: 606 PG/ML
WBC # FLD AUTO: 11.39 K/UL

## 2023-11-24 ENCOUNTER — OUTPATIENT (OUTPATIENT)
Dept: OUTPATIENT SERVICES | Facility: HOSPITAL | Age: 83
LOS: 1 days | End: 2023-11-24
Payer: MEDICARE

## 2023-11-24 ENCOUNTER — APPOINTMENT (OUTPATIENT)
Dept: RADIOLOGY | Facility: CLINIC | Age: 83
End: 2023-11-24
Payer: MEDICARE

## 2023-11-24 DIAGNOSIS — M24.542 CONTRACTURE, LEFT HAND: ICD-10-CM

## 2023-11-24 PROCEDURE — 73130 X-RAY EXAM OF HAND: CPT

## 2023-11-24 PROCEDURE — 73130 X-RAY EXAM OF HAND: CPT | Mod: 26,LT

## 2023-11-27 ENCOUNTER — APPOINTMENT (OUTPATIENT)
Dept: NEUROLOGY | Facility: CLINIC | Age: 83
End: 2023-11-27

## 2023-12-01 ENCOUNTER — APPOINTMENT (OUTPATIENT)
Dept: NEUROLOGY | Facility: CLINIC | Age: 83
End: 2023-12-01
Payer: MEDICARE

## 2023-12-01 DIAGNOSIS — R26.81 UNSTEADINESS ON FEET: ICD-10-CM

## 2023-12-01 PROCEDURE — 99215 OFFICE O/P EST HI 40 MIN: CPT | Mod: 95

## 2023-12-01 RX ORDER — ONABOTULINUMTOXINA 200 [USP'U]/1
200 INJECTION, POWDER, LYOPHILIZED, FOR SOLUTION INTRADERMAL; INTRAMUSCULAR
Qty: 1 | Refills: 3 | Status: ACTIVE | COMMUNITY
Start: 2023-12-01 | End: 1900-01-01

## 2023-12-04 PROBLEM — R26.81 GAIT INSTABILITY: Status: ACTIVE | Noted: 2022-01-25

## 2023-12-08 ENCOUNTER — APPOINTMENT (OUTPATIENT)
Dept: RADIOLOGY | Facility: CLINIC | Age: 83
End: 2023-12-08
Payer: MEDICARE

## 2023-12-08 ENCOUNTER — APPOINTMENT (OUTPATIENT)
Dept: CT IMAGING | Facility: CLINIC | Age: 83
End: 2023-12-08
Payer: MEDICARE

## 2023-12-08 ENCOUNTER — APPOINTMENT (OUTPATIENT)
Dept: NEUROLOGY | Facility: CLINIC | Age: 83
End: 2023-12-08
Payer: MEDICARE

## 2023-12-08 ENCOUNTER — OUTPATIENT (OUTPATIENT)
Dept: OUTPATIENT SERVICES | Facility: HOSPITAL | Age: 83
LOS: 1 days | End: 2023-12-08
Payer: MEDICARE

## 2023-12-08 DIAGNOSIS — G31.84 MILD COGNITIVE IMPAIRMENT OF UNCERTAIN OR UNKNOWN ETIOLOGY: ICD-10-CM

## 2023-12-08 DIAGNOSIS — M24.542 CONTRACTURE, LEFT HAND: ICD-10-CM

## 2023-12-08 DIAGNOSIS — G45.1 CAROTID ARTERY SYNDROME (HEMISPHERIC): ICD-10-CM

## 2023-12-08 DIAGNOSIS — G31.83 NEUROCOGNITIVE DISORDER WITH LEWY BODIES: ICD-10-CM

## 2023-12-08 PROCEDURE — 70450 CT HEAD/BRAIN W/O DYE: CPT | Mod: 26,MH

## 2023-12-08 PROCEDURE — 70450 CT HEAD/BRAIN W/O DYE: CPT

## 2023-12-08 PROCEDURE — 64615 CHEMODENERV MUSC MIGRAINE: CPT

## 2024-03-15 ENCOUNTER — APPOINTMENT (OUTPATIENT)
Dept: NEUROLOGY | Facility: CLINIC | Age: 84
End: 2024-03-15
Payer: MEDICARE

## 2024-03-15 VITALS
HEART RATE: 83 BPM | BODY MASS INDEX: 21.66 KG/M2 | DIASTOLIC BLOOD PRESSURE: 87 MMHG | SYSTOLIC BLOOD PRESSURE: 131 MMHG | HEIGHT: 67 IN | WEIGHT: 138 LBS

## 2024-03-15 DIAGNOSIS — F02.80 DEMENTIA WITH LEWY BODIES: ICD-10-CM

## 2024-03-15 DIAGNOSIS — G31.83 DEMENTIA WITH LEWY BODIES: ICD-10-CM

## 2024-03-15 DIAGNOSIS — M24.542 CONTRACTURE, LEFT HAND: ICD-10-CM

## 2024-03-15 PROCEDURE — 64615 CHEMODENERV MUSC MIGRAINE: CPT

## 2024-03-15 NOTE — PROCEDURE
[FreeTextEntry1] : Consent obtained and expectations of the Botox injections for spasticity were discussed with the daughter and patient. 10 units were injected in the left biceps 30 units were injected in the left flexor carpi radialis 30 units were injected in the left flexor carpi ulnaris 30 units were injected in flexor digitorum profundus 4/5  100 units total. none wasted. Patient tolerated procedure well and will return in 3 mo for reinjection.  Patient will likely need 200 unit Botox for next visit as her legs are affected as well.

## 2024-03-15 NOTE — REASON FOR VISIT
[Procedure: _________] : a [unfilled] procedure visit [Formal Caregiver] : formal caregiver [Family Member] : family member

## 2024-07-22 ENCOUNTER — APPOINTMENT (OUTPATIENT)
Dept: NEUROLOGY | Facility: CLINIC | Age: 84
End: 2024-07-22
Payer: MEDICARE

## 2024-07-22 DIAGNOSIS — M24.542 CONTRACTURE, LEFT HAND: ICD-10-CM

## 2024-07-22 PROCEDURE — 64642 CHEMODENERV 1 EXTREMITY 1-4: CPT

## 2024-07-22 PROCEDURE — 99213 OFFICE O/P EST LOW 20 MIN: CPT | Mod: 25

## 2024-07-22 NOTE — PHYSICAL EXAM
[FreeTextEntry1] : video didn't work [Affect] : the affect was normal [Person] : disoriented to person [Place] : disoriented to place [Time] : disoriented to time [Paresis Pronator Drift Right-Sided] : no pronator drift on the right [Paresis Pronator Drift Left-Sided] : no pronator drift on the left [1+] : Patella left 1+ [FreeTextEntry4] : video froze

## 2024-07-22 NOTE — PROCEDURE
[FreeTextEntry1] : Consent obtained and expectations of the Botox injections for spasticity were discussed with the daughter and patient. 20 units were injected in the left biceps 40 units were injected in the left flexor carpi radialis 40 units were injected in the left flexor carpi ulnaris 50 units were injected in flexor digitorum profundus 4/5  150U used. 50 units wasted.  Patient tolerated procedure well and will return in 3 mo for reinjection.  Patient will likely need 200 unit Botox for next visit as her legs are affected as well.

## 2024-07-22 NOTE — HISTORY OF PRESENT ILLNESS
[FreeTextEntry1] :   Interval history December 4, 2023: Patient recently had a reaction to medication for her UTI and she is sent for physical therapy by her PMD.  Patient's head tilt is worse and now her left hand is in a fist.  Is difficult for family to clean her hand because it is very tight.  Daughter states that the physical therapist are able to open her hand.  Patient has a pending CAT scan of the head that was ordered by her PMD to rule out stroke as a cause of her left hand spasticity.  interval history 7/22/24: She is her for botox injections. Will increase the dose as her left hand is still contracted and hard to clean. Daughter requested OT.

## 2024-08-22 ENCOUNTER — APPOINTMENT (OUTPATIENT)
Dept: CARDIOLOGY | Facility: CLINIC | Age: 84
End: 2024-08-22

## 2024-11-04 ENCOUNTER — APPOINTMENT (OUTPATIENT)
Dept: NEUROLOGY | Facility: CLINIC | Age: 84
End: 2024-11-04

## 2024-12-23 ENCOUNTER — NON-APPOINTMENT (OUTPATIENT)
Age: 84
End: 2024-12-23

## 2024-12-23 ENCOUNTER — APPOINTMENT (OUTPATIENT)
Dept: NEUROLOGY | Facility: CLINIC | Age: 84
End: 2024-12-23
Payer: MEDICARE

## 2024-12-23 VITALS — SYSTOLIC BLOOD PRESSURE: 135 MMHG | DIASTOLIC BLOOD PRESSURE: 64 MMHG | HEART RATE: 81 BPM

## 2024-12-23 DIAGNOSIS — M24.542 CONTRACTURE, LEFT HAND: ICD-10-CM

## 2024-12-23 PROCEDURE — 64642 CHEMODENERV 1 EXTREMITY 1-4: CPT

## 2024-12-23 PROCEDURE — 99215 OFFICE O/P EST HI 40 MIN: CPT | Mod: 25

## 2025-01-09 ENCOUNTER — NON-APPOINTMENT (OUTPATIENT)
Age: 85
End: 2025-01-09

## 2025-01-09 ENCOUNTER — APPOINTMENT (OUTPATIENT)
Dept: CARDIOLOGY | Facility: CLINIC | Age: 85
End: 2025-01-09
Payer: MEDICARE

## 2025-01-09 VITALS
DIASTOLIC BLOOD PRESSURE: 60 MMHG | HEART RATE: 91 BPM | SYSTOLIC BLOOD PRESSURE: 102 MMHG | OXYGEN SATURATION: 97 % | RESPIRATION RATE: 18 BRPM

## 2025-01-09 VITALS — WEIGHT: 135 LBS | BODY MASS INDEX: 21.14 KG/M2

## 2025-01-09 DIAGNOSIS — I10 ESSENTIAL (PRIMARY) HYPERTENSION: ICD-10-CM

## 2025-01-09 DIAGNOSIS — Z86.73 PERSONAL HISTORY OF TRANSIENT ISCHEMIC ATTACK (TIA), AND CEREBRAL INFARCTION W/OUT RESIDUAL DEFICITS: ICD-10-CM

## 2025-01-09 DIAGNOSIS — E78.2 MIXED HYPERLIPIDEMIA: ICD-10-CM

## 2025-01-09 DIAGNOSIS — R60.0 LOCALIZED EDEMA: ICD-10-CM

## 2025-01-09 PROCEDURE — 93000 ELECTROCARDIOGRAM COMPLETE: CPT

## 2025-01-09 PROCEDURE — 99214 OFFICE O/P EST MOD 30 MIN: CPT

## 2025-01-09 PROCEDURE — G2211 COMPLEX E/M VISIT ADD ON: CPT

## 2025-01-09 RX ORDER — AMLODIPINE BESYLATE AND BENAZEPRIL HYDROCHLORIDE 2.5; 1 MG/1; MG/1
2.5-1 CAPSULE ORAL
Refills: 0 | Status: DISCONTINUED | COMMUNITY

## 2025-01-09 RX ORDER — MAGNESIUM HYDROXIDE 600 MG
600 TABLET,CHEWABLE ORAL
Refills: 0 | Status: ACTIVE | COMMUNITY

## 2025-01-13 LAB
ALBUMIN SERPL ELPH-MCNC: 3.5 G/DL
ALP BLD-CCNC: 73 U/L
ALT SERPL-CCNC: 16 U/L
ANION GAP SERPL CALC-SCNC: 10 MMOL/L
AST SERPL-CCNC: 25 U/L
BASOPHILS # BLD AUTO: 0.03 K/UL
BASOPHILS NFR BLD AUTO: 0.3 %
BILIRUB SERPL-MCNC: 0.2 MG/DL
BUN SERPL-MCNC: 15 MG/DL
CALCIUM SERPL-MCNC: 9.4 MG/DL
CHLORIDE SERPL-SCNC: 105 MMOL/L
CO2 SERPL-SCNC: 27 MMOL/L
CREAT SERPL-MCNC: 0.8 MG/DL
EGFR: 74 ML/MIN/1.73M2
EOSINOPHIL # BLD AUTO: 0.07 K/UL
EOSINOPHIL NFR BLD AUTO: 0.8 %
GLUCOSE SERPL-MCNC: 88 MG/DL
HCT VFR BLD CALC: 36.8 %
HGB BLD-MCNC: 11.5 G/DL
IMM GRANULOCYTES NFR BLD AUTO: 0.3 %
LYMPHOCYTES # BLD AUTO: 3.37 K/UL
LYMPHOCYTES NFR BLD AUTO: 38.2 %
MAN DIFF?: NORMAL
MCHC RBC-ENTMCNC: 31.3 G/DL
MCHC RBC-ENTMCNC: 31.9 PG
MCV RBC AUTO: 101.9 FL
MONOCYTES # BLD AUTO: 0.67 K/UL
MONOCYTES NFR BLD AUTO: 7.6 %
NEUTROPHILS # BLD AUTO: 4.65 K/UL
NEUTROPHILS NFR BLD AUTO: 52.8 %
NT-PROBNP SERPL-MCNC: 1513 PG/ML
PLATELET # BLD AUTO: 261 K/UL
POTASSIUM SERPL-SCNC: 4.3 MMOL/L
PROT SERPL-MCNC: 6.2 G/DL
RBC # BLD: 3.61 M/UL
RBC # FLD: 13.6 %
SODIUM SERPL-SCNC: 142 MMOL/L
TSH SERPL-ACNC: 1.72 UIU/ML
WBC # FLD AUTO: 8.82 K/UL

## 2025-02-20 ENCOUNTER — APPOINTMENT (OUTPATIENT)
Dept: CARDIOLOGY | Facility: CLINIC | Age: 85
End: 2025-02-20

## 2025-03-04 ENCOUNTER — APPOINTMENT (OUTPATIENT)
Dept: NEUROLOGY | Facility: CLINIC | Age: 85
End: 2025-03-04

## 2025-03-14 ENCOUNTER — APPOINTMENT (OUTPATIENT)
Dept: VASCULAR SURGERY | Facility: CLINIC | Age: 85
End: 2025-03-14

## 2025-03-14 VITALS
DIASTOLIC BLOOD PRESSURE: 67 MMHG | HEART RATE: 74 BPM | TEMPERATURE: 98 F | SYSTOLIC BLOOD PRESSURE: 104 MMHG | BODY MASS INDEX: 21.19 KG/M2 | OXYGEN SATURATION: 97 % | HEIGHT: 67 IN | WEIGHT: 135 LBS

## 2025-03-14 DIAGNOSIS — R60.9 EDEMA, UNSPECIFIED: ICD-10-CM

## 2025-03-14 DIAGNOSIS — M79.604 PAIN IN RIGHT LEG: ICD-10-CM

## 2025-03-14 DIAGNOSIS — G31.84 MILD COGNITIVE IMPAIRMENT, SO STATED: ICD-10-CM

## 2025-03-14 DIAGNOSIS — I70.209 UNSPECIFIED ATHEROSCLEROSIS OF NATIVE ARTERIES OF EXTREMITIES, UNSPECIFIED EXTREMITY: ICD-10-CM

## 2025-03-14 DIAGNOSIS — I73.9 PERIPHERAL VASCULAR DISEASE, UNSPECIFIED: ICD-10-CM

## 2025-03-14 PROCEDURE — 93970 EXTREMITY STUDY: CPT

## 2025-03-14 PROCEDURE — 93923 UPR/LXTR ART STDY 3+ LVLS: CPT

## 2025-03-14 PROCEDURE — 99205 OFFICE O/P NEW HI 60 MIN: CPT

## 2025-03-20 ENCOUNTER — APPOINTMENT (OUTPATIENT)
Dept: CARDIOLOGY | Facility: CLINIC | Age: 85
End: 2025-03-20

## 2025-03-21 ENCOUNTER — APPOINTMENT (OUTPATIENT)
Dept: NEUROLOGY | Facility: CLINIC | Age: 85
End: 2025-03-21
Payer: MEDICARE

## 2025-03-21 DIAGNOSIS — G31.83 DEMENTIA WITH LEWY BODIES: ICD-10-CM

## 2025-03-21 DIAGNOSIS — Z86.59 PERSONAL HISTORY OF OTHER MENTAL AND BEHAVIORAL DISORDERS: ICD-10-CM

## 2025-03-21 DIAGNOSIS — F02.80 DEMENTIA WITH LEWY BODIES: ICD-10-CM

## 2025-03-21 PROCEDURE — G2211 COMPLEX E/M VISIT ADD ON: CPT | Mod: 2W

## 2025-03-21 PROCEDURE — 99215 OFFICE O/P EST HI 40 MIN: CPT | Mod: 2W

## 2025-03-21 RX ORDER — MIRTAZAPINE 7.5 MG/1
7.5 TABLET, FILM COATED ORAL
Qty: 90 | Refills: 3 | Status: ACTIVE | COMMUNITY
Start: 2025-03-21 | End: 1900-01-01

## 2025-06-18 ENCOUNTER — NON-APPOINTMENT (OUTPATIENT)
Age: 85
End: 2025-06-18

## 2025-06-19 ENCOUNTER — APPOINTMENT (OUTPATIENT)
Dept: CARDIOLOGY | Facility: CLINIC | Age: 85
End: 2025-06-19
Payer: MEDICARE

## 2025-06-19 PROCEDURE — 93306 TTE W/DOPPLER COMPLETE: CPT

## 2025-06-30 ENCOUNTER — APPOINTMENT (OUTPATIENT)
Dept: NEUROLOGY | Facility: CLINIC | Age: 85
End: 2025-06-30
Payer: MEDICARE

## 2025-06-30 VITALS
HEIGHT: 67 IN | WEIGHT: 135 LBS | DIASTOLIC BLOOD PRESSURE: 67 MMHG | BODY MASS INDEX: 21.19 KG/M2 | HEART RATE: 89 BPM | SYSTOLIC BLOOD PRESSURE: 108 MMHG

## 2025-06-30 PROCEDURE — 99215 OFFICE O/P EST HI 40 MIN: CPT | Mod: 25

## 2025-06-30 PROCEDURE — 64615 CHEMODENERV MUSC MIGRAINE: CPT

## 2025-09-02 ENCOUNTER — RX RENEWAL (OUTPATIENT)
Age: 85
End: 2025-09-02